# Patient Record
Sex: FEMALE | Race: BLACK OR AFRICAN AMERICAN | NOT HISPANIC OR LATINO | Employment: OTHER | URBAN - METROPOLITAN AREA
[De-identification: names, ages, dates, MRNs, and addresses within clinical notes are randomized per-mention and may not be internally consistent; named-entity substitution may affect disease eponyms.]

---

## 2019-08-27 ENCOUNTER — OFFICE VISIT (OUTPATIENT)
Dept: OBSTETRICS AND GYNECOLOGY | Facility: CLINIC | Age: 59
End: 2019-08-27

## 2019-08-27 VITALS
HEIGHT: 59 IN | WEIGHT: 130 LBS | BODY MASS INDEX: 26.21 KG/M2 | DIASTOLIC BLOOD PRESSURE: 86 MMHG | SYSTOLIC BLOOD PRESSURE: 136 MMHG

## 2019-08-27 DIAGNOSIS — L81.9 PIGMENTED SKIN LESION: ICD-10-CM

## 2019-08-27 DIAGNOSIS — N95.1 MENOPAUSE SYNDROME: ICD-10-CM

## 2019-08-27 DIAGNOSIS — Z00.00 ANNUAL PHYSICAL EXAM: Primary | ICD-10-CM

## 2019-08-27 DIAGNOSIS — N95.2 ATROPHIC VAGINITIS: ICD-10-CM

## 2019-08-27 PROCEDURE — 99213 OFFICE O/P EST LOW 20 MIN: CPT | Performed by: OBSTETRICS & GYNECOLOGY

## 2019-08-27 PROCEDURE — 99386 PREV VISIT NEW AGE 40-64: CPT | Performed by: OBSTETRICS & GYNECOLOGY

## 2019-08-27 RX ORDER — AMLODIPINE BESYLATE 10 MG/1
10 TABLET ORAL DAILY
COMMUNITY
End: 2020-10-06

## 2019-08-27 RX ORDER — ATORVASTATIN CALCIUM 10 MG/1
10 TABLET, FILM COATED ORAL DAILY
COMMUNITY
End: 2021-03-08

## 2019-08-27 RX ORDER — ALBUTEROL SULFATE 90 UG/1
2 AEROSOL, METERED RESPIRATORY (INHALATION)
COMMUNITY
Start: 2019-08-01 | End: 2022-01-13 | Stop reason: SDUPTHER

## 2019-08-27 RX ORDER — POTASSIUM CHLORIDE 750 MG/1
TABLET, FILM COATED, EXTENDED RELEASE ORAL
COMMUNITY
Start: 2019-06-04 | End: 2020-07-16

## 2019-08-27 NOTE — PROGRESS NOTES
HALEY Nieves  is a 58 y.o. female who presents for 2 reasons.  First, she would like to establish care and have a routine gynecologic exam.  She reports a recent normal mammogram.  Also, a recent normal colonoscopy.  Bowels and bladder are functioning normally.  Overall, she is feeling well.  Second, she would like to discuss menopausal symptoms.  She experience hot flashes and night sweats as well as mood swings and vaginal dryness.  This has persisted since she went through the change approximately 10 years ago.  It is neither worsened, nor has it improved.  Mostly, she is troubled by vaginal dryness and difficulty with intercourse.  This has not responded adequately to a personal lubricant.    Chief Complaint   Patient presents with   • New Gyn     Patient is here for a new gyn annual and discuss menapausel symptoms.       Past Medical History:   Diagnosis Date   • Hyperlipidemia    • Hypertension        Past Surgical History:   Procedure Laterality Date   •  SECTION         Social History     Socioeconomic History   • Marital status:      Spouse name: Not on file   • Number of children: Not on file   • Years of education: Not on file   • Highest education level: Not on file   Tobacco Use   • Smoking status: Current Some Day Smoker   • Smokeless tobacco: Never Used   Substance and Sexual Activity   • Alcohol use: Yes     Comment: socially   • Drug use: No   • Sexual activity: Yes     Partners: Male       The following portions of the patient's history were reviewed and updated as appropriate: allergies, current medications, past family history, past medical history, past social history, past surgical history and problem list.    Review of Systems this is positive for hot flashes and night sweats.  It is positive for mood swings.  It is positive for vaginal dryness.  It is negative for urinary or fecal incontinence.  It is negative for unexplained weight change.  It is negative for vaginal  bleeding.  All other systems are reviewed and are negative          Physical Exam   Constitutional: She is oriented to person, place, and time. She appears well-developed and well-nourished.   HENT:   Head: Normocephalic and atraumatic.   Cardiovascular: Normal rate and regular rhythm.   Pulmonary/Chest: Effort normal and breath sounds normal. She has no wheezes. She has no rales.   The breasts are homogeneous.  There are no palpable lumps.  Nipple discharge and axillary adenopathy are absent.   Abdominal: Soft. She exhibits no distension. There is no tenderness.   Genitourinary: Uterus normal. There is no lesion on the right labia. There is no lesion on the left labia. Cervix exhibits no motion tenderness. Right adnexum displays no mass and no tenderness. Left adnexum displays no mass and no tenderness.   Genitourinary Comments: The vagina is atrophic.  There is a minimal cystocele.  No rectocele.  Good apical support.  No palpable pelvic masses   Neurological: She is alert and oriented to person, place, and time.   Skin: Skin is warm and dry.   Nursing note and vitals reviewed.      Assessment    Raquel was seen today for new gyn.    Diagnoses and all orders for this visit:    Annual physical exam    Menopause syndrome    Atrophic vaginitis    Pigmented skin lesion  -     Ambulatory Referral to Dermatology        Plan  1. Annual examination and Pap smear performed  2. Counseled regarding importance of regular mammograms.  The patient had a mammogram earlier this spring.  It was negative.  3. Counseled regarding colon cancer screening.  The patient has a family history of colon cancer.  She is current with colon cancer screening.  4. Menopausal symptoms and atrophic vaginitis.  Counseled regarding the pathophysiology of this condition and options for management.  20 minutes of a 40-minute visit were spent in direct face-to-face counseling on this issue.  For hot flashes, night sweats and mood swings, we discussed  the benefits and risks of expectant management versus aerobic exercise versus treatment with effects or versus hormone replacement therapy.  I answered the patient's questions.  For now, she plans to try aerobic exercise for this.  We also discussed dyspareunia due to atrophic vaginitis.  This is only partially responded to a personal lubricant.  We discussed the benefits and risks of using vaginal estrogen.  The patient is considering her options and will call with a decision.    5. Return in about 1 year (around 8/27/2020).    Social History     Tobacco Use   Smoking Status Current Some Day Smoker   6.     7.

## 2019-08-30 LAB
CONV .: NORMAL
CYTOLOGIST CVX/VAG CYTO: NORMAL
CYTOLOGY CVX/VAG DOC CYTO: NORMAL
CYTOLOGY CVX/VAG DOC THIN PREP: NORMAL
DX ICD CODE: NORMAL
HIV 1 & 2 AB SER-IMP: NORMAL
Lab: NORMAL
OTHER STN SPEC: NORMAL
STAT OF ADQ CVX/VAG CYTO-IMP: NORMAL

## 2020-07-16 ENCOUNTER — OFFICE VISIT (OUTPATIENT)
Dept: FAMILY MEDICINE CLINIC | Facility: CLINIC | Age: 60
End: 2020-07-16

## 2020-07-16 VITALS
WEIGHT: 125.8 LBS | HEART RATE: 88 BPM | DIASTOLIC BLOOD PRESSURE: 90 MMHG | BODY MASS INDEX: 25.36 KG/M2 | RESPIRATION RATE: 16 BRPM | SYSTOLIC BLOOD PRESSURE: 140 MMHG | OXYGEN SATURATION: 99 % | HEIGHT: 59 IN | TEMPERATURE: 97.3 F

## 2020-07-16 DIAGNOSIS — Z23 NEED FOR 23-POLYVALENT PNEUMOCOCCAL POLYSACCHARIDE VACCINE: ICD-10-CM

## 2020-07-16 DIAGNOSIS — I10 ESSENTIAL HYPERTENSION: Primary | ICD-10-CM

## 2020-07-16 DIAGNOSIS — E87.6 HYPOKALEMIA: ICD-10-CM

## 2020-07-16 PROBLEM — Z80.0 FAMILY HISTORY OF COLON CANCER IN MOTHER: Status: ACTIVE | Noted: 2019-02-27

## 2020-07-16 PROBLEM — J20.9 ACUTE BRONCHITIS WITH CHRONIC OBSTRUCTIVE PULMONARY DISEASE (COPD): Status: ACTIVE | Noted: 2019-08-01

## 2020-07-16 PROBLEM — E78.5 HYPERLIPIDEMIA: Status: ACTIVE | Noted: 2020-07-16

## 2020-07-16 PROBLEM — Z12.11 COLON CANCER SCREENING: Status: ACTIVE | Noted: 2019-01-14

## 2020-07-16 PROBLEM — Z72.0 TOBACCO ABUSE: Status: ACTIVE | Noted: 2019-08-01

## 2020-07-16 PROBLEM — J44.0 ACUTE BRONCHITIS WITH CHRONIC OBSTRUCTIVE PULMONARY DISEASE (COPD) (HCC): Status: ACTIVE | Noted: 2019-08-01

## 2020-07-16 PROCEDURE — 99213 OFFICE O/P EST LOW 20 MIN: CPT | Performed by: INTERNAL MEDICINE

## 2020-07-16 PROCEDURE — 90732 PPSV23 VACC 2 YRS+ SUBQ/IM: CPT | Performed by: INTERNAL MEDICINE

## 2020-07-16 PROCEDURE — 90471 IMMUNIZATION ADMIN: CPT | Performed by: INTERNAL MEDICINE

## 2020-07-16 RX ORDER — IBUPROFEN 600 MG/1
600 TABLET ORAL DAILY PRN
COMMUNITY
Start: 2020-01-02

## 2020-07-17 LAB
BUN SERPL-MCNC: 9 MG/DL (ref 8–23)
BUN/CREAT SERPL: 13.6 (ref 7–25)
CALCIUM SERPL-MCNC: 9.7 MG/DL (ref 8.6–10.5)
CHLORIDE SERPL-SCNC: 103 MMOL/L (ref 98–107)
CO2 SERPL-SCNC: 27.4 MMOL/L (ref 22–29)
CREAT SERPL-MCNC: 0.66 MG/DL (ref 0.57–1)
GLUCOSE SERPL-MCNC: 121 MG/DL (ref 65–99)
POTASSIUM SERPL-SCNC: 3.9 MMOL/L (ref 3.5–5.2)
SODIUM SERPL-SCNC: 141 MMOL/L (ref 136–145)

## 2020-07-24 DIAGNOSIS — J20.9 ACUTE BRONCHITIS WITH CHRONIC OBSTRUCTIVE PULMONARY DISEASE (COPD) (HCC): Primary | ICD-10-CM

## 2020-07-24 DIAGNOSIS — J44.0 ACUTE BRONCHITIS WITH CHRONIC OBSTRUCTIVE PULMONARY DISEASE (COPD) (HCC): Primary | ICD-10-CM

## 2020-07-25 RX ORDER — LISINOPRIL 10 MG/1
20 TABLET ORAL DAILY
Qty: 180 TABLET | Refills: 3 | Status: SHIPPED | OUTPATIENT
Start: 2020-07-25 | End: 2020-10-23

## 2020-08-04 ENCOUNTER — TELEPHONE (OUTPATIENT)
Dept: FAMILY MEDICINE CLINIC | Facility: CLINIC | Age: 60
End: 2020-08-04

## 2020-08-04 NOTE — TELEPHONE ENCOUNTER
----- Message from Raymond Saldaña MD sent at 8/3/2020 10:19 PM EDT -----  Please call the patient regarding her abnormal result. Keep follow-up regarding glucose

## 2020-08-04 NOTE — TELEPHONE ENCOUNTER
CALLED AND PROVIDED PT WITH TEST RESULTS. ALSO SCHEDULE PT A FOLLOW UP APTT ON 8/24/20.      *HUB TO SHARE**

## 2020-08-06 ENCOUNTER — TELEPHONE (OUTPATIENT)
Dept: FAMILY MEDICINE CLINIC | Facility: CLINIC | Age: 60
End: 2020-08-06

## 2020-08-06 NOTE — TELEPHONE ENCOUNTER
PATIENT CALLED FOR MED REFILL    FAMOTIDINE 20 MG    SHE WENT TO [facility] AND THEY STATED THAT THEY CAN'T GET THE MEDICATION. IS THERE ANOTHER MEDICATION THAT CAN BE PRESCRIBED IN PLACE OF THIS.        [place] [id number] [facility] [address]  - [phone]  - [phone]   [phone]    PATIENT CALL BACK NUMBER [phone]

## 2020-08-08 DIAGNOSIS — K21.9 GASTROESOPHAGEAL REFLUX DISEASE WITHOUT ESOPHAGITIS: Primary | ICD-10-CM

## 2020-08-08 RX ORDER — PANTOPRAZOLE SODIUM 20 MG/1
20 TABLET, DELAYED RELEASE ORAL DAILY
Qty: 30 TABLET | Refills: 3 | Status: SHIPPED | OUTPATIENT
Start: 2020-08-08 | End: 2020-11-09

## 2020-08-12 ENCOUNTER — TELEPHONE (OUTPATIENT)
Dept: FAMILY MEDICINE CLINIC | Facility: CLINIC | Age: 60
End: 2020-08-12

## 2020-08-12 NOTE — TELEPHONE ENCOUNTER
PATIENT STATED THAT ON CALL DOCTOR FOLLOW UP ON BLOOD PRESSURE.  ON CALL DOCTOR DID NOT RECOMMEND PATIENT GO TO THE ER.  PATIENT REPORTS BLOOD PRESSURE 193/[phone]  PLEASE REVIEW    PATIENT CALL BACK: [phone]

## 2020-08-13 ENCOUNTER — OFFICE VISIT (OUTPATIENT)
Dept: FAMILY MEDICINE CLINIC | Facility: CLINIC | Age: 60
End: 2020-08-13

## 2020-08-13 VITALS
HEIGHT: 59 IN | HEART RATE: 97 BPM | BODY MASS INDEX: 25.28 KG/M2 | WEIGHT: 125.4 LBS | TEMPERATURE: 97.5 F | RESPIRATION RATE: 16 BRPM | OXYGEN SATURATION: 98 % | DIASTOLIC BLOOD PRESSURE: 80 MMHG | SYSTOLIC BLOOD PRESSURE: 140 MMHG

## 2020-08-13 DIAGNOSIS — F41.1 ANXIETY STATE: ICD-10-CM

## 2020-08-13 DIAGNOSIS — R73.09 ELEVATED GLUCOSE: Primary | ICD-10-CM

## 2020-08-13 LAB
EXPIRATION DATE: NORMAL
HBA1C MFR BLD: 6.4 %
Lab: NORMAL

## 2020-08-13 PROCEDURE — 36416 COLLJ CAPILLARY BLOOD SPEC: CPT | Performed by: INTERNAL MEDICINE

## 2020-08-13 PROCEDURE — 83036 HEMOGLOBIN GLYCOSYLATED A1C: CPT | Performed by: INTERNAL MEDICINE

## 2020-08-13 PROCEDURE — 99214 OFFICE O/P EST MOD 30 MIN: CPT | Performed by: INTERNAL MEDICINE

## 2020-08-13 RX ORDER — OMEPRAZOLE 20 MG/1
20 CAPSULE, DELAYED RELEASE ORAL DAILY
Qty: 30 CAPSULE | Refills: 1 | Status: SHIPPED | OUTPATIENT
Start: 2020-08-13 | End: 2021-08-19

## 2020-08-18 ENCOUNTER — TELEPHONE (OUTPATIENT)
Dept: FAMILY MEDICINE CLINIC | Facility: CLINIC | Age: 60
End: 2020-08-18

## 2020-08-18 NOTE — TELEPHONE ENCOUNTER
----- Message from Raymond Saldaña MD sent at 8/17/2020  6:23 AM EDT -----  Call patient to inform that results were normal

## 2020-08-18 NOTE — PROGRESS NOTES
2020    CC: Hypertension (follow up)  .        HPI  Hypertension   This is a chronic problem. The current episode started more than 1 month ago. The problem has been gradually improving since onset. The problem is controlled. Associated symptoms include anxiety. There are no associated agents to hypertension.        Anam Nieves is a 59 y.o. female.      The following portions of the patient's history were reviewed and updated as appropriate: allergies, current medications, past family history, past medical history, past social history, past surgical history and problem list.    Problem List  Patient Active Problem List   Diagnosis   • Colon cancer screening   • Acute bronchitis with chronic obstructive pulmonary disease (COPD) (CMS/[facility])   • Family history of colon cancer in mother   • Hyperlipidemia   • Hypertension   • Hypokalemia   • Iliotibial band syndrome of right side   • Low back pain radiating to right leg   • Tobacco abuse   • Trochanteric bursitis of right hip       Past Medical History  Past Medical History:   Diagnosis Date   • Hyperlipidemia    • Hypertension        Surgical History  Past Surgical History:   Procedure Laterality Date   •  SECTION         Family History  Family History   Problem Relation Age of Onset   • Heart attack Father    • Hypertension Mother    • Heart disease Sister    • Diabetes Sister        Social History  Social History    Tobacco Use      Smoking status: Current Some Day Smoker        Packs/day: 0.25        Years: 20.00        Pack years: 5      Smokeless tobacco: Never Used       Is the Patient a current tobacco user? No    Allergies  No Known Allergies    Current Medications    Current Outpatient Medications:   •  albuterol sulfate  (90 Base) MCG/ACT inhaler, Inhale 2 puffs., Disp: , Rfl:   •  amLODIPine (NORVASC) 10 MG tablet, Take 10 mg by mouth Daily., Disp: , Rfl:   •  atorvastatin (LIPITOR) 10 MG tablet, Take 10 mg by mouth  Daily., Disp: , Rfl:   •  Calcium Carbonate 1500 (600 Ca) MG tablet, Take 1 tablet by mouth Daily., Disp: , Rfl:   •  ibuprofen (ADVIL,MOTRIN) 600 MG tablet, Take 600 mg by mouth Daily As Needed., Disp: , Rfl:   •  MULTIPLE VITAMINS-MINERALS ER PO, Take 1 tablet by mouth Daily., Disp: , Rfl:   •  vitamin E 100 UNIT capsule, Take 100 Units by mouth Daily., Disp: , Rfl:   •  lisinopril (PRINIVIL,ZESTRIL) 10 MG tablet, Take 2 tablets by mouth Daily for 90 days. TAKE 1 TAB DAILY, Disp: 180 tablet, Rfl: 3  •  omeprazole (PrilOSEC) 20 MG capsule, Take 1 capsule by mouth Daily., Disp: 30 capsule, Rfl: 1  •  pantoprazole (Protonix) 20 MG EC tablet, Take 1 tablet by mouth Daily., Disp: 30 tablet, Rfl: 3     Review of System  Review of Systems   Constitutional: Negative.    HENT: Negative.    Eyes: Negative.    Respiratory: Negative.    Cardiovascular: Negative.    Gastrointestinal: Negative.    Musculoskeletal: Negative.    Skin: Negative.    Psychiatric/Behavioral: Negative.      I have reviewed and confirmed the accuracy of the ROS as documented by the MA/LPN/RN Raymond Saldaña MD    Vitals:    07/16/20 0906   BP: 140/90   Pulse: 88   Resp: 16   Temp: 97.3 °F (36.3 °C)   SpO2: 99%     Body mass index is 25.41 kg/m².    Objective     Office Visit on 07/16/2020   Component Date Value Ref Range Status   • Glucose 07/16/2020 121* 65 - 99 mg/dL Final   • BUN 07/16/2020 9  8 - 23 mg/dL Final   • Creatinine 07/16/2020 0.66  0.57 - 1.00 mg/dL Final   • eGFR Non African Am 07/16/2020 91  >60 mL/min/1.73 Final   • eGFR African Am 07/16/2020 110  >60 mL/min/1.73 Final   • BUN/Creatinine Ratio 07/16/2020 13.6  7.0 - 25.0 Final   • Sodium 07/16/2020 141  136 - 145 mmol/L Final   • Potassium 07/16/2020 3.9  3.5 - 5.2 mmol/L Final   • Chloride 07/16/2020 103  98 - 107 mmol/L Final   • Total CO2 07/16/2020 27.4  22.0 - 29.0 mmol/L Final   • Calcium 07/16/2020 9.7  8.6 - 10.5 mg/dL Final       Physical Exam  Physical Exam    Constitutional: She appears well-developed and well-nourished.   Eyes: Conjunctivae and EOM are normal.   Neck: Normal range of motion. Neck supple.   Cardiovascular: Normal rate, regular rhythm and normal heart sounds.   Pulmonary/Chest: Effort normal and breath sounds normal.   Musculoskeletal: Normal range of motion.   Skin: Skin is warm and dry.   Psychiatric: She has a normal mood and affect. Her behavior is normal.   Nursing note and vitals reviewed.      Assessment/Plan   Raquel was seen today for hypertension.    This patient presented for follow-up of hypertension and hypokalemia.  With her last visit on 5/21/20 patient had low potassium level and potassium tablets were added to correct this.  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  His presented at least twice in the emergency room over the past several months due to hypertension but her blood pressure was able to be controlled after short period of time.  With her last visit we asked her to get an [employer] and paternal blood pressure machine to accurately check her blood pressure home as she had previously used a wrist blood pressure monitor.  As part of the visit today the patient was to bring a blood pressure monitor with her we could evaluate its accuracy.  Unfortunately, the patient did not bring her machine today.    Check her blood pressure by me today was 134/80 in the left arm sitting position standard cuff.  A [employer] she's taken the medication as prescribed.    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              Diagnoses and all orders for this visit:    Essential hypertension: Controlled  -     Cancel: Basic metabolic panel  -     pneumococcal polysaccharide 23-valent (PNEUMOVAX-23) vaccine 0.5 mL  -     Basic metabolic panel    Hypokalemia: Further evaluation  pending    Need for 23-polyvalent pneumococcal polysaccharide vaccine      Plan #1.) physical exam in 2 months  #2.)  The patient will consider getting a shingles vaccine  #3.)  Consider getting the pneumonia 23 vaccine  #4.)  Chem-7       Raymond Saldaña MD  07/16/2020

## 2020-08-23 NOTE — PROGRESS NOTES
2020    CC: Hypertension (follow up)  .        HPI  Hypertension   This is a chronic problem. The current episode started more than 1 month ago. The problem has been gradually improving since onset. The problem is uncontrolled. Associated symptoms include anxiety. Pertinent negatives include no chest pain or shortness of breath. There are no associated agents to hypertension. Risk factors for coronary artery disease include stress. Past treatments include ACE inhibitors and calcium channel blockers. The current treatment provides mild improvement.        Anam Nieves is a 59 y.o. female.      The following portions of the patient's history were reviewed and updated as appropriate: allergies, current medications, past family history, past medical history, past social history, past surgical history and problem list.    Problem List  Patient Active Problem List   Diagnosis   • Colon cancer screening   • Acute bronchitis with chronic obstructive pulmonary disease (COPD) (CMS/[facility])   • Family history of colon cancer in mother   • Hyperlipidemia   • Hypertension   • Hypokalemia   • Iliotibial band syndrome of right side   • Low back pain radiating to right leg   • Tobacco abuse   • Trochanteric bursitis of right hip       Past Medical History  Past Medical History:   Diagnosis Date   • Hyperlipidemia    • Hypertension        Surgical History  Past Surgical History:   Procedure Laterality Date   •  SECTION         Family History  Family History   Problem Relation Age of Onset   • Heart attack Father    • Hypertension Mother    • Heart disease Sister    • Diabetes Sister        Social History  Social History    Tobacco Use      Smoking status: Current Some Day Smoker        Packs/day: 0.25        Years: 20.00        Pack years: 5      Smokeless tobacco: Never Used       Is the Patient a current tobacco user? No    Allergies  No Known Allergies    Current Medications    Current Outpatient  Medications:   •  albuterol sulfate  (90 Base) MCG/ACT inhaler, Inhale 2 puffs., Disp: , Rfl:   •  amLODIPine (NORVASC) 10 MG tablet, Take 10 mg by mouth Daily., Disp: , Rfl:   •  atorvastatin (LIPITOR) 10 MG tablet, Take 10 mg by mouth Daily., Disp: , Rfl:   •  Calcium Carbonate 1500 (600 Ca) MG tablet, Take 1 tablet by mouth Daily., Disp: , Rfl:   •  ibuprofen (ADVIL,MOTRIN) 600 MG tablet, Take 600 mg by mouth Daily As Needed., Disp: , Rfl:   •  lisinopril (PRINIVIL,ZESTRIL) 10 MG tablet, Take 2 tablets by mouth Daily for 90 days. TAKE 1 TAB DAILY, Disp: 180 tablet, Rfl: 3  •  MULTIPLE VITAMINS-MINERALS ER PO, Take 1 tablet by mouth Daily., Disp: , Rfl:   •  pantoprazole (Protonix) 20 MG EC tablet, Take 1 tablet by mouth Daily., Disp: 30 tablet, Rfl: 3  •  vitamin E 100 UNIT capsule, Take 100 Units by mouth Daily., Disp: , Rfl:   •  omeprazole (PrilOSEC) 20 MG capsule, Take 1 capsule by mouth Daily., Disp: 30 capsule, Rfl: 1     Review of System  Review of Systems   Constitutional: Negative.  Negative for chills and fever.   HENT: Negative for congestion.    Eyes: Negative.    Respiratory: Negative.  Negative for cough and shortness of breath.    Cardiovascular: Negative.  Negative for chest pain.   Gastrointestinal: Negative.  Negative for abdominal pain.   Musculoskeletal: Negative.  Negative for myalgias.   Skin: Negative.    Neurological: Negative for dizziness and confusion.   Psychiatric/Behavioral: Negative.  Negative for behavioral problems.     I have reviewed and confirmed the accuracy of the ROS as documented by the MA/LPN/RN Raymond Saldaña MD    Vitals:    08/13/20 1419   BP: 140/80   Pulse: 97   Resp: 16   Temp: 97.5 °F (36.4 °C)   SpO2: 98%     Body mass index is 25.33 kg/m².    Objective     Office Visit on 08/13/2020   Component Date Value Ref Range Status   • Hemoglobin A1C 08/13/2020 6.4  % Final   • Lot Number 08/13/2020 [id number]   Final   • Expiration Date 08/13/2020 01/13/2022    Final   Office Visit on 07/16/2020   Component Date Value Ref Range Status   • Glucose 07/16/2020 121* 65 - 99 mg/dL Final   • BUN 07/16/2020 9  8 - 23 mg/dL Final   • Creatinine 07/16/2020 0.66  0.57 - 1.00 mg/dL Final   • eGFR Non African Am 07/16/2020 91  >60 mL/min/1.73 Final   • eGFR African Am 07/16/2020 110  >60 mL/min/1.73 Final   • BUN/Creatinine Ratio 07/16/2020 13.6  7.0 - 25.0 Final   • Sodium 07/16/2020 141  136 - 145 mmol/L Final   • Potassium 07/16/2020 3.9  3.5 - 5.2 mmol/L Final   • Chloride 07/16/2020 103  98 - 107 mmol/L Final   • Total CO2 07/16/2020 27.4  22.0 - 29.0 mmol/L Final   • Calcium 07/16/2020 9.7  8.6 - 10.5 mg/dL Final       Physical Exam  Physical Exam   Constitutional: She appears well-developed and well-nourished.   Eyes: Conjunctivae and EOM are normal.   Neck: Normal range of motion.   Cardiovascular: Normal rate, regular rhythm and normal heart sounds.   Pulmonary/Chest: Effort normal and breath sounds normal.   Skin: Skin is warm and dry.   Psychiatric: She has a normal mood and affect. Her behavior is normal.   Nursing note and vitals reviewed.      Assessment/Plan   Raquel was seen today for hypertension.    This pleasant patient has been to the emergency room to 3 times over the past several months with the hypertension this is usually late to anxiety.  She is currently working on her dissertation for under much stress etc.  Last visit we asked her to get ambulatory blood pressure machine and bring that in.  She has done so.    Review of her level shows that her blood pressure ranges from 146-152/90-94 here in the office annually I get 130/80 her Omron 500 series reads 140/90.  I therefore feel that her blood pressure machine is reading somewhat high think that her blood pressure elevations are accentuated by stress is made that she admits to sleeping poorly for the past several nights a graft her glucose machine also reads excellent today at 121 hematoma A1c done today is  6.4!.  The patient relates that when she takes the atorvastatin she feels tingly and nervous so we have asked her to DC that for now.        Diagnoses and all orders for this visit:    Elevated glucose  -     POCT glycated hemoglobin, total; Standing  -     POCT glycated hemoglobin, total    Anxiety state    Other orders  -     omeprazole (PrilOSEC) 20 MG capsule; Take 1 capsule by mouth Daily.    Plan  #1.)  Treat her symptomatically for anxiety with lorazepam 0.5 mg a half tab by mouth daily at bedtime for the next few nights.    Plan #2.)  DC atorvastatin for now  3.)  Follow-up in the next few weeks for reevaluation of anxiety.      Note that she has completed her  but is waiting for her program chair.         Raymond Saldaña MD  08/13/2020

## 2020-09-01 ENCOUNTER — OFFICE VISIT (OUTPATIENT)
Dept: OBSTETRICS AND GYNECOLOGY | Facility: CLINIC | Age: 60
End: 2020-09-01

## 2020-09-01 VITALS
HEIGHT: 59 IN | WEIGHT: 126 LBS | SYSTOLIC BLOOD PRESSURE: 118 MMHG | BODY MASS INDEX: 25.4 KG/M2 | DIASTOLIC BLOOD PRESSURE: 67 MMHG

## 2020-09-01 DIAGNOSIS — Z78.0 MENOPAUSE: ICD-10-CM

## 2020-09-01 DIAGNOSIS — Z00.00 ANNUAL PHYSICAL EXAM: Primary | ICD-10-CM

## 2020-09-01 PROCEDURE — 99213 OFFICE O/P EST LOW 20 MIN: CPT | Performed by: OBSTETRICS & GYNECOLOGY

## 2020-09-01 PROCEDURE — 99396 PREV VISIT EST AGE 40-64: CPT | Performed by: OBSTETRICS & GYNECOLOGY

## 2020-09-01 NOTE — PROGRESS NOTES
HALEY Nieves  is a 59 y.o. female who presents for 2 reasons.  First, she is due for her routine gynecologic exam.  Overall, she is feeling well.  Bowels and bladder are functioning normally.  The patient reports a recent normal mammogram.  This is done through the [facility] system and I do not have results.  The patient reports that her results go to her primary care physician.  The patient is current with colon cancer screening.  Next, the patient has several symptoms of the menopause.  She still experiences hot flashes and night sweats.  Also mood swings and vaginal dryness.  She has started on an antianxiety medicine with her primary care physician, but this has not brought about relief of the above-named symptoms.    Chief Complaint   Patient presents with   • Gynecologic Exam     Patient is here for a annual and vaginal dryness.       Past Medical History:   Diagnosis Date   • Hyperlipidemia    • Hypertension        Past Surgical History:   Procedure Laterality Date   •  SECTION         Social History     Socioeconomic History   • Marital status:      Spouse name: Not on file   • Number of children: Not on file   • Years of education: Not on file   • Highest education level: Not on file   Tobacco Use   • Smoking status: Current Some Day Smoker     Packs/day: 0.25     Years: 20.00     Pack years: 5.00   • Smokeless tobacco: Never Used   Substance and Sexual Activity   • Alcohol use: Yes     Comment: socially   • Drug use: No   • Sexual activity: Yes     Partners: Male       The following portions of the patient's history were reviewed and updated as appropriate: allergies, current medications, past family history, past medical history, past social history, past surgical history and problem list.    Review of Systems      This is positive for hot flashes and night sweats.  It is positive for mood swings.  It is positive for vaginal dryness.  It is negative for fever or chills.  Negative  for itching.  Negative for unexplained weight change.  All other systems are reviewed and are negative    Physical Exam   Constitutional: She is oriented to person, place, and time. She appears well-developed and well-nourished.   HENT:   Head: Normocephalic and atraumatic.   Cardiovascular: Normal rate and regular rhythm.   Pulmonary/Chest: Effort normal and breath sounds normal. She has no wheezes. She has no rales.   The breasts are homogeneous.  There are no palpable lumps.  Nipple discharge and axillary adenopathy are absent.   Abdominal: Soft. She exhibits no distension. There is no tenderness.   Genitourinary: Uterus normal. There is no lesion on the right labia. There is no lesion on the left labia. Cervix exhibits no motion tenderness. Right adnexum displays no mass and no tenderness. Left adnexum displays no mass and no tenderness.   Genitourinary Comments: The vagina is atrophic, without lesion.  Support is adequate   Neurological: She is alert and oriented to person, place, and time.   Skin: Skin is warm and dry.   Nursing note and vitals reviewed.      Assessment    Raquel was seen today for gynecologic exam.    Diagnoses and all orders for this visit:    Annual physical exam    Menopause        Plan  1. Annual examination performed  2. The patient reports that she is current with mammograms and that they have been negative.  3. The patient is current with colon cancer screening.  4. Menopausal symptoms including hot flashes, night sweats, mood swings and vaginal dryness.  We discussed the pathophysiology of this condition and options for treatment.  20 minutes of a 30-minute visit were spent in direct face-to-face counseling on this issue.  We discussed the benefits and risks of using hormone replacement.  Data from the [facility] were reviewed and the patient's questions were answered.  The patient is already doing aerobic exercise.  We also discussed the possibility of using vaginal estrogen to address the  vaginal dryness, although this would not help with hot flashes and night sweats.  The patient is not a candidate for Effexor, as she is already using an antianxiety through her primary care physician.  I answered the patient's questions.  Ultimately, she has decided to manage expectantly.  She will call if she would like to discuss this further.    5. Return in about 1 year (around 9/1/2021).    Social History     Tobacco Use   Smoking Status Current Some Day Smoker   • Packs/day: 0.25   • Years: 20.00   • Pack years: 5.00   6.

## 2020-09-03 LAB
CONV .: NORMAL
CYTOLOGIST CVX/VAG CYTO: NORMAL
CYTOLOGY CVX/VAG DOC CYTO: NORMAL
CYTOLOGY CVX/VAG DOC THIN PREP: NORMAL
DX ICD CODE: NORMAL
HIV 1 & 2 AB SER-IMP: NORMAL
OTHER STN SPEC: NORMAL
STAT OF ADQ CVX/VAG CYTO-IMP: NORMAL

## 2020-09-15 ENCOUNTER — TELEPHONE (OUTPATIENT)
Dept: FAMILY MEDICINE CLINIC | Facility: CLINIC | Age: 60
End: 2020-09-15

## 2020-09-15 NOTE — TELEPHONE ENCOUNTER
PATIENT RECEIVED LETTER FROM INSURANCE STATING DR NIX WOULD NO LONGER BE HER PROVIDER    SHE WOULD LIKE A CALL BACK TO DISCUSS    PLEASE CALL HER: [phone]

## 2020-10-06 RX ORDER — AMLODIPINE BESYLATE 10 MG/1
TABLET ORAL
Qty: 30 TABLET | Refills: 2 | Status: SHIPPED | OUTPATIENT
Start: 2020-10-06 | End: 2021-01-07

## 2020-12-12 RX ORDER — LISINOPRIL 20 MG/1
20 TABLET ORAL DAILY
Qty: 30 TABLET | Refills: 3 | Status: SHIPPED | OUTPATIENT
Start: 2020-12-12 | End: 2021-06-14

## 2020-12-15 ENCOUNTER — TELEPHONE (OUTPATIENT)
Dept: FAMILY MEDICINE CLINIC | Facility: CLINIC | Age: 60
End: 2020-12-15

## 2020-12-15 NOTE — TELEPHONE ENCOUNTER
Patient wanted to advise that her script for Pantoprazole was actually called in and her  went and picked it up yesterday. Patient wanted to let the MA know so she doesn't call [facility].    Patient can be reached at [phone].

## 2021-01-07 RX ORDER — AMLODIPINE BESYLATE 10 MG/1
TABLET ORAL
Qty: 30 TABLET | Refills: 1 | Status: SHIPPED | OUTPATIENT
Start: 2021-01-07 | End: 2021-03-10 | Stop reason: SDUPTHER

## 2021-03-08 ENCOUNTER — OFFICE VISIT (OUTPATIENT)
Dept: FAMILY MEDICINE CLINIC | Facility: CLINIC | Age: 61
End: 2021-03-08

## 2021-03-08 VITALS
RESPIRATION RATE: 16 BRPM | HEIGHT: 59 IN | HEART RATE: 101 BPM | SYSTOLIC BLOOD PRESSURE: 140 MMHG | BODY MASS INDEX: 25.4 KG/M2 | WEIGHT: 126 LBS | OXYGEN SATURATION: 99 % | DIASTOLIC BLOOD PRESSURE: 80 MMHG | TEMPERATURE: 97.8 F

## 2021-03-08 DIAGNOSIS — F41.9 ANXIETY: Chronic | ICD-10-CM

## 2021-03-08 DIAGNOSIS — I10 ESSENTIAL HYPERTENSION: Primary | Chronic | ICD-10-CM

## 2021-03-08 PROCEDURE — 99214 OFFICE O/P EST MOD 30 MIN: CPT | Performed by: INTERNAL MEDICINE

## 2021-03-08 RX ORDER — LISINOPRIL 40 MG/1
40 TABLET ORAL DAILY
Qty: 30 TABLET | Refills: 3 | Status: SHIPPED | OUTPATIENT
Start: 2021-03-08 | End: 2021-07-06

## 2021-03-08 NOTE — PROGRESS NOTES
2021    CC: Hypertension (follow up.  Has an oder when urinating.  no pain, itching, or burning)  .        HPI  Hypertension  This is a chronic problem. The current episode started today. The problem has been gradually improving since onset. The problem is controlled. There are no associated agents to hypertension. There are no known risk factors for coronary artery disease. Past treatments include nothing. Current antihypertension treatment includes ACE inhibitors and calcium channel blockers. There are no compliance problems.         Anam Nieves is a 60 y.o. female.      The following portions of the patient's history were reviewed and updated as appropriate: allergies, current medications, past family history, past medical history, past social history, past surgical history and problem list.    Problem List  Patient Active Problem List   Diagnosis   • Colon cancer screening   • Acute bronchitis with chronic obstructive pulmonary disease (COPD) (CMS/[facility])   • Family history of colon cancer in mother   • Hyperlipidemia   • Hypertension   • Hypokalemia   • Iliotibial band syndrome of right side   • Low back pain radiating to right leg   • Tobacco abuse   • Trochanteric bursitis of right hip       Past Medical History  Past Medical History:   Diagnosis Date   • Hyperlipidemia    • Hypertension        Surgical History  Past Surgical History:   Procedure Laterality Date   •  SECTION         Family History  Family History   Problem Relation Age of Onset   • Heart attack Father    • Hypertension Mother    • Heart disease Sister    • Diabetes Sister        Social History  Social History    Tobacco Use      Smoking status: Current Some Day Smoker        Packs/day: 0.25        Years: 20.00        Pack years: 5      Smokeless tobacco: Never Used       Is the Patient a current tobacco user? No    Allergies  No Known Allergies    Current Medications    Current Outpatient Medications:   •   albuterol sulfate  (90 Base) MCG/ACT inhaler, Inhale 2 puffs., Disp: , Rfl:   •  amLODIPine (NORVASC) 10 MG tablet, TAKE ONE TABLET BY MOUTH EVERY MORNING, Disp: 30 tablet, Rfl: 1  •  ibuprofen (ADVIL,MOTRIN) 600 MG tablet, Take 600 mg by mouth Daily As Needed., Disp: , Rfl:   •  lisinopril (PRINIVIL,ZESTRIL) 20 MG tablet, Take 1 tablet by mouth Daily., Disp: 30 tablet, Rfl: 3  •  MULTIPLE VITAMINS-MINERALS ER PO, Take 1 tablet by mouth Daily., Disp: , Rfl:   •  omeprazole (PrilOSEC) 20 MG capsule, Take 1 capsule by mouth Daily., Disp: 30 capsule, Rfl: 1  •  vitamin E 100 UNIT capsule, Take 100 Units by mouth Daily., Disp: , Rfl:   •  lisinopril (PRINIVIL,ZESTRIL) 40 MG tablet, Take 1 tablet by mouth Daily., Disp: 30 tablet, Rfl: 3     Review of System  Review of Systems   Respiratory: Negative.    Cardiovascular: Negative.    Endocrine: Negative.    Genitourinary: Negative.      I have reviewed and confirmed the accuracy of the ROS as documented by the MA/LPN/RN Raymond Saldaña MD    Vitals:    03/08/21 1402   BP: 140/80   Pulse: 101   Resp: 16   Temp: 97.8 °F (36.6 °C)   SpO2: 99%     Body mass index is 25.45 kg/m².    Objective     Physical Exam  Physical Exam  Cardiovascular:      Rate and Rhythm: Normal rate and regular rhythm.      Pulses: Normal pulses.      Heart sounds: Normal heart sounds.   Pulmonary:      Effort: Pulmonary effort is normal.      Breath sounds: Normal breath sounds.         Assessment/Plan      This pleasant patient presents for follow-up of hypertension.  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  She has submitted 3 chapters but is still having some difficulty with the last 2 and her preceptor.  She relates that she is sleeping poorly and has pounding headache at times.  She does not want an anxiolytic.    Blood pressure reevaluation by me in the left arm sitting position standard cuff was 146/84.  Patient  currently is on amlodipine 10 mg and lisinopril 20 mg.        Diagnoses and all orders for this visit:    1. Essential hypertension (Primary)    2. Anxiety    Other orders  -     lisinopril (PRINIVIL,ZESTRIL) 40 MG tablet; Take 1 tablet by mouth Daily.  Dispense: 30 tablet; Refill: 3      Plan:  1.)  Increase lisinopril to 40 mg p.o. every morning  2.)  Continue amlodipine 10 mg 1 tab p.o. every morning  3.)  Follow-up in the next few weeks for reevaluation.       Raymond Saldaña MD  03/08/2021

## 2021-03-10 DIAGNOSIS — I10 ESSENTIAL HYPERTENSION: Primary | ICD-10-CM

## 2021-03-10 RX ORDER — AMLODIPINE BESYLATE 10 MG/1
10 TABLET ORAL EVERY MORNING
Qty: 30 TABLET | Refills: 1 | Status: SHIPPED | OUTPATIENT
Start: 2021-03-10 | End: 2021-05-10

## 2021-03-29 ENCOUNTER — OFFICE VISIT (OUTPATIENT)
Dept: FAMILY MEDICINE CLINIC | Facility: CLINIC | Age: 61
End: 2021-03-29

## 2021-03-29 VITALS
HEIGHT: 59 IN | WEIGHT: 122 LBS | RESPIRATION RATE: 16 BRPM | TEMPERATURE: 98 F | OXYGEN SATURATION: 98 % | DIASTOLIC BLOOD PRESSURE: 76 MMHG | SYSTOLIC BLOOD PRESSURE: 130 MMHG | HEART RATE: 89 BPM | BODY MASS INDEX: 24.6 KG/M2

## 2021-03-29 DIAGNOSIS — I10 ESSENTIAL HYPERTENSION: Primary | Chronic | ICD-10-CM

## 2021-03-29 DIAGNOSIS — F41.9 ANXIETY: Chronic | ICD-10-CM

## 2021-03-29 PROCEDURE — 99213 OFFICE O/P EST LOW 20 MIN: CPT | Performed by: INTERNAL MEDICINE

## 2021-04-22 NOTE — PROGRESS NOTES
2021    CC: Hypertension (follow up...no other issues)  .        HPI  Hypertension  This is a chronic problem. The current episode started more than 1 year ago. The problem has been gradually improving since onset. The problem is controlled. There are no associated agents to hypertension. Risk factors for coronary artery disease include dyslipidemia and stress. Past treatments include nothing. Current antihypertension treatment includes ACE inhibitors.        Anam Nieves is a 60 y.o. female.      The following portions of the patient's history were reviewed and updated as appropriate: allergies, current medications, past family history, past medical history, past social history, past surgical history and problem list.    Problem List  Patient Active Problem List   Diagnosis   • Colon cancer screening   • Acute bronchitis with chronic obstructive pulmonary disease (COPD) (CMS/[facility])   • Family history of colon cancer in mother   • Hyperlipidemia   • Hypertension   • Hypokalemia   • Iliotibial band syndrome of right side   • Low back pain radiating to right leg   • Tobacco abuse   • Trochanteric bursitis of right hip       Past Medical History  Past Medical History:   Diagnosis Date   • Hyperlipidemia    • Hypertension        Surgical History  Past Surgical History:   Procedure Laterality Date   •  SECTION         Family History  Family History   Problem Relation Age of Onset   • Heart attack Father    • Hypertension Mother    • Heart disease Sister    • Diabetes Sister        Social History  Social History    Tobacco Use      Smoking status: Current Some Day Smoker        Packs/day: 0.25        Years: 20.00        Pack years: 5      Smokeless tobacco: Never Used       Is the Patient a current tobacco user? No    Allergies  No Known Allergies    Current Medications    Current Outpatient Medications:   •  albuterol sulfate  (90 Base) MCG/ACT inhaler, Inhale 2 puffs., Disp: , Rfl:    •  amLODIPine (NORVASC) 10 MG tablet, Take 1 tablet by mouth Every Morning., Disp: 30 tablet, Rfl: 1  •  ibuprofen (ADVIL,MOTRIN) 600 MG tablet, Take 600 mg by mouth Daily As Needed., Disp: , Rfl:   •  lisinopril (PRINIVIL,ZESTRIL) 20 MG tablet, Take 1 tablet by mouth Daily., Disp: 30 tablet, Rfl: 3  •  lisinopril (PRINIVIL,ZESTRIL) 40 MG tablet, Take 1 tablet by mouth Daily., Disp: 30 tablet, Rfl: 3  •  MULTIPLE VITAMINS-MINERALS ER PO, Take 1 tablet by mouth Daily., Disp: , Rfl:   •  omeprazole (PrilOSEC) 20 MG capsule, Take 1 capsule by mouth Daily., Disp: 30 capsule, Rfl: 1  •  vitamin E 100 UNIT capsule, Take 100 Units by mouth Daily., Disp: , Rfl:      Review of System  Review of Systems   Respiratory: Negative.    Cardiovascular: Negative.    Gastrointestinal: Negative.    Endocrine: Negative.      I have reviewed and confirmed the accuracy of the ROS as documented by the MA/LPN/RN Raymond Saldaña MD    Vitals:    03/29/21 1107   BP: 130/76   Pulse: 89   Resp: 16   Temp: 98 °F (36.7 °C)   SpO2: 98%     Body mass index is 24.64 kg/m².    Objective     Physical Exam  Physical Exam  Vitals reviewed.   Constitutional:       Appearance: Normal appearance.   Cardiovascular:      Rate and Rhythm: Normal rate and regular rhythm.      Pulses: Normal pulses.      Heart sounds: Normal heart sounds.   Musculoskeletal:         General: Normal range of motion.   Neurological:      Mental Status: She is alert.         Assessment/Plan      This patient presents for follow-up of hypertension.  She was last seen on 3/8 with elevated blood pressure.  Her pressure at that time was 146/84.  We increased her lisinopril to 40 mg by mouth daily.  Patient relates that she feels better but she still under much stress.  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  She  Understands the importance of keeping her sodium intake low.  As stated allowing time if  possible for outlets such as walking etc. To reduce her stress level.    Note is made she's lost 4 pounds from her last visit but feels overall better.  We'll continue her lisinopril at 40 mg and reevaluate in the next several months.  We have avoided use of anxiolytics because of changes to the thinking acuity it would most likely cause.    We'll continue her on the 2 antihypertensives of amlodipine and lisinopril.  We'll see her back in 3 months unless she needs to see us sooner.        Diagnoses and all orders for this visit:    1. Essential hypertension (Primary)    2. Anxiety             Raymond Saldaña MD  03/29/2021

## 2021-05-10 DIAGNOSIS — I10 ESSENTIAL HYPERTENSION: ICD-10-CM

## 2021-05-10 RX ORDER — AMLODIPINE BESYLATE 10 MG/1
TABLET ORAL
Qty: 30 TABLET | Refills: 3 | Status: SHIPPED | OUTPATIENT
Start: 2021-05-10 | End: 2021-08-19

## 2021-06-14 ENCOUNTER — OFFICE VISIT (OUTPATIENT)
Dept: FAMILY MEDICINE CLINIC | Facility: CLINIC | Age: 61
End: 2021-06-14

## 2021-06-14 VITALS
BODY MASS INDEX: 25 KG/M2 | HEIGHT: 59 IN | SYSTOLIC BLOOD PRESSURE: 130 MMHG | WEIGHT: 124 LBS | DIASTOLIC BLOOD PRESSURE: 78 MMHG | RESPIRATION RATE: 16 BRPM

## 2021-06-14 DIAGNOSIS — G44.309 HEADACHES DUE TO OLD HEAD INJURY: ICD-10-CM

## 2021-06-14 DIAGNOSIS — S09.90XS HEADACHES DUE TO OLD HEAD INJURY: ICD-10-CM

## 2021-06-14 DIAGNOSIS — I10 ESSENTIAL HYPERTENSION: Primary | ICD-10-CM

## 2021-06-14 PROCEDURE — 99214 OFFICE O/P EST MOD 30 MIN: CPT | Performed by: INTERNAL MEDICINE

## 2021-06-14 RX ORDER — HYDROCHLOROTHIAZIDE 12.5 MG/1
12.5 TABLET ORAL DAILY
Qty: 30 TABLET | Refills: 1 | Status: SHIPPED | OUTPATIENT
Start: 2021-06-14 | End: 2021-08-13 | Stop reason: SDUPTHER

## 2021-06-14 NOTE — PROGRESS NOTES
2021    CC: Hypertension (follow up...no other issues)  .        HPI  Hypertension  This is a chronic problem. The current episode started more than 1 year ago. The problem has been gradually improving since onset. The problem is controlled. Current antihypertension treatment includes ACE inhibitors and calcium channel blockers.        Anam Nieves is a 60 y.o. female.      The following portions of the patient's history were reviewed and updated as appropriate: allergies, current medications, past family history, past medical history, past social history, past surgical history and problem list.    Problem List  Patient Active Problem List   Diagnosis   • Colon cancer screening   • Acute bronchitis with chronic obstructive pulmonary disease (COPD) (CMS/[facility])   • Family history of colon cancer in mother   • Hyperlipidemia   • Hypertension   • Hypokalemia   • Iliotibial band syndrome of right side   • Low back pain radiating to right leg   • Tobacco abuse   • Trochanteric bursitis of right hip       Past Medical History  Past Medical History:   Diagnosis Date   • Hyperlipidemia    • Hypertension        Surgical History  Past Surgical History:   Procedure Laterality Date   •  SECTION         Family History  Family History   Problem Relation Age of Onset   • Heart attack Father    • Hypertension Mother    • Heart disease Sister    • Diabetes Sister        Social History  Social History    Tobacco Use      Smoking status: Current Some Day Smoker        Packs/day: 0.25        Years: 20.00        Pack years: 5      Smokeless tobacco: Never Used       Is the Patient a current tobacco user? No    Allergies  No Known Allergies    Current Medications    Current Outpatient Medications:   •  albuterol sulfate  (90 Base) MCG/ACT inhaler, Inhale 2 puffs., Disp: , Rfl:   •  amLODIPine (NORVASC) 10 MG tablet, TAKE ONE TABLET BY MOUTH EVERY MORNING, Disp: 30 tablet, Rfl: 3  •  ibuprofen  (ADVIL,MOTRIN) 600 MG tablet, Take 600 mg by mouth Daily As Needed., Disp: , Rfl:   •  lisinopril (PRINIVIL,ZESTRIL) 40 MG tablet, Take 1 tablet by mouth Daily., Disp: 30 tablet, Rfl: 3  •  MULTIPLE VITAMINS-MINERALS ER PO, Take 1 tablet by mouth Daily., Disp: , Rfl:   •  omeprazole (PrilOSEC) 20 MG capsule, Take 1 capsule by mouth Daily., Disp: 30 capsule, Rfl: 1  •  vitamin E 100 UNIT capsule, Take 100 Units by mouth Daily., Disp: , Rfl:      Review of System  Review of Systems   Constitutional: Negative.    HENT: Negative.    Respiratory: Negative.    Cardiovascular: Negative.    Endocrine: Negative.    Genitourinary: Negative.      I have reviewed and confirmed the accuracy of the ROS as documented by the MA/LPN/RN Raymond Saldaña MD    Vitals:    06/14/21 1459   BP: 130/78   Resp: 16     Body mass index is 25.04 kg/m².    Objective     Physical Exam  Physical Exam  Constitutional:       Appearance: Normal appearance.   Cardiovascular:      Rate and Rhythm: Normal rate.      Pulses: Normal pulses.   Musculoskeletal:      Cervical back: Normal range of motion.   Neurological:      Mental Status: She is alert.         Assessment/Plan        This pleasant patient presents at this time for follow-up of hypertension.  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  She relates she continues to have a headache however which occurs off and on has been going on for the past year or so.  She relates that is usually relieved with Tylenol and is low-grade with intensity of 1-2/10.  She is currently on amlodipine 10 mg p.o. daily, lisinopril 40 mg p.o. every morning.  Her weight is holding about the same.    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  Her blood pressure rechecked by me today in the left arm sitting position standard cuff was  136/80.    We will decrease her amlodipine to 5 mg p.o. every morning, continue her lisinopril at 40 mg, and add HCTZ 25 mg one half tab p.o. every morning.  We will look to take her off the amlodipine if her blood pressure and other parameters look good.    We will decrease her amlodipine to 5 mg p.o. every morning      Diagnoses and all orders for this visit:    1. Essential hypertension (Primary)    2. Headaches due to old head injury         Plan:  1.)  Decrease amlodipine to 5 mg p.o. every morning  2.)  Add HCTZ 25 mg one half tab p.o. every morning  3.)  Continue lisinopril 40 mg 1 tab p.o. every morning  4.)  Follow-up in 1 to 2 weeks.    Raymond Saldaña MD  06/14/2021

## 2021-06-28 ENCOUNTER — OFFICE VISIT (OUTPATIENT)
Dept: FAMILY MEDICINE CLINIC | Facility: CLINIC | Age: 61
End: 2021-06-28

## 2021-06-28 VITALS
DIASTOLIC BLOOD PRESSURE: 72 MMHG | SYSTOLIC BLOOD PRESSURE: 130 MMHG | BODY MASS INDEX: 24.44 KG/M2 | HEIGHT: 59 IN | RESPIRATION RATE: 16 BRPM | WEIGHT: 121.2 LBS

## 2021-06-28 DIAGNOSIS — I10 ESSENTIAL HYPERTENSION: Primary | ICD-10-CM

## 2021-06-28 PROCEDURE — 99213 OFFICE O/P EST LOW 20 MIN: CPT | Performed by: INTERNAL MEDICINE

## 2021-06-28 NOTE — PROGRESS NOTES
2021    CC: Hypertension (follow up...no other issues)  .        HPI  Hypertension  This is a chronic problem. The current episode started more than 1 year ago. The problem has been rapidly improving since onset. The problem is controlled. There are no known risk factors for coronary artery disease. Current antihypertension treatment includes ACE inhibitors.        Anam Nieves is a 60 y.o. female.      The following portions of the patient's history were reviewed and updated as appropriate: allergies, current medications, past family history, past medical history, past social history, past surgical history and problem list.    Problem List  Patient Active Problem List   Diagnosis   • Colon cancer screening   • Acute bronchitis with chronic obstructive pulmonary disease (COPD) (CMS/[facility])   • Family history of colon cancer in mother   • Hyperlipidemia   • Hypertension   • Hypokalemia   • Iliotibial band syndrome of right side   • Low back pain radiating to right leg   • Tobacco abuse   • Trochanteric bursitis of right hip       Past Medical History  Past Medical History:   Diagnosis Date   • Hyperlipidemia    • Hypertension        Surgical History  Past Surgical History:   Procedure Laterality Date   •  SECTION         Family History  Family History   Problem Relation Age of Onset   • Heart attack Father    • Hypertension Mother    • Heart disease Sister    • Diabetes Sister        Social History  Social History    Tobacco Use      Smoking status: Current Some Day Smoker        Packs/day: 0.25        Years: 20.00        Pack years: 5      Smokeless tobacco: Never Used       Is the Patient a current tobacco user? No    Allergies  No Known Allergies    Current Medications    Current Outpatient Medications:   •  albuterol sulfate  (90 Base) MCG/ACT inhaler, Inhale 2 puffs., Disp: , Rfl:   •  amLODIPine (NORVASC) 10 MG tablet, TAKE ONE TABLET BY MOUTH EVERY MORNING (Patient taking  differently: 5 mg Daily.), Disp: 30 tablet, Rfl: 3  •  hydroCHLOROthiazide (HYDRODIURIL) 12.5 MG tablet, Take 1 tablet by mouth Daily., Disp: 30 tablet, Rfl: 1  •  ibuprofen (ADVIL,MOTRIN) 600 MG tablet, Take 600 mg by mouth Daily As Needed., Disp: , Rfl:   •  lisinopril (PRINIVIL,ZESTRIL) 40 MG tablet, Take 1 tablet by mouth Daily., Disp: 30 tablet, Rfl: 3  •  MULTIPLE VITAMINS-MINERALS ER PO, Take 1 tablet by mouth Daily., Disp: , Rfl:   •  omeprazole (PrilOSEC) 20 MG capsule, Take 1 capsule by mouth Daily., Disp: 30 capsule, Rfl: 1  •  vitamin E 100 UNIT capsule, Take 100 Units by mouth Daily., Disp: , Rfl:      Review of System  Review of Systems   Constitutional: Negative.    HENT: Negative.    Eyes: Negative.    Respiratory: Negative.    Cardiovascular: Negative.    Gastrointestinal: Negative.      I have reviewed and confirmed the accuracy of the ROS as documented by the MA/LPN/RN Raymond Saldaña MD    Vitals:    06/28/21 1203   BP: 130/72   Resp: 16     Body mass index is 24.48 kg/m².    Objective     Physical Exam  Physical Exam  Cardiovascular:      Rate and Rhythm: Regular rhythm.      Pulses: Normal pulses.      Heart sounds: Normal heart sounds.   Pulmonary:      Effort: Pulmonary effort is normal.      Breath sounds: Normal breath sounds.         Assessment/Plan        This 60-year-old presents at this time for follow-up of hypertension.  With her last visit we decreased her amlodipine from 5 mg to 2.5 mg secondary to her blood pressure.  She relates she is feeling a little better but her blood pressure still is relatively low at 106/70 in the left arm sitting position standard cuff.  She is also on lisinopril 40 mg p.o. daily and HCT 25 mg 1 tab p.o. daily which was her last medication started.    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  This lessen stress may be contributing to her lower blood pressure as well.  In any case we will  discontinue the remaining 2.5 mg of amlodipine look to make further reductions in the medication for hypertension as indicated.    Follow-up in 1 to 2 months.        Diagnoses and all orders for this visit:    1. Essential hypertension (Primary)    Other orders  -     Cologuard - Stool, Per Rectum; Future      Plan:  1.)  DC amlodipine 2.5 mg  2.)  Follow-up in 1 month for reevaluation.       Raymond Saldaña MD  06/28/2021

## 2021-07-06 RX ORDER — LISINOPRIL 40 MG/1
TABLET ORAL
Qty: 30 TABLET | Refills: 2 | Status: SHIPPED | OUTPATIENT
Start: 2021-07-06 | End: 2021-10-04

## 2021-08-13 DIAGNOSIS — I10 ESSENTIAL HYPERTENSION: Primary | ICD-10-CM

## 2021-08-13 RX ORDER — HYDROCHLOROTHIAZIDE 12.5 MG/1
12.5 TABLET ORAL DAILY
Qty: 30 TABLET | Refills: 1 | Status: SHIPPED | OUTPATIENT
Start: 2021-08-13 | End: 2021-10-11

## 2021-08-19 ENCOUNTER — OFFICE VISIT (OUTPATIENT)
Dept: FAMILY MEDICINE CLINIC | Facility: CLINIC | Age: 61
End: 2021-08-19

## 2021-08-19 VITALS
HEIGHT: 59 IN | BODY MASS INDEX: 24.52 KG/M2 | RESPIRATION RATE: 16 BRPM | DIASTOLIC BLOOD PRESSURE: 84 MMHG | SYSTOLIC BLOOD PRESSURE: 130 MMHG | WEIGHT: 121.6 LBS

## 2021-08-19 DIAGNOSIS — F17.200 SMOKER: ICD-10-CM

## 2021-08-19 DIAGNOSIS — I10 ESSENTIAL HYPERTENSION: ICD-10-CM

## 2021-08-19 DIAGNOSIS — R20.2 PARESTHESIA OF BOTH FEET: Primary | ICD-10-CM

## 2021-08-19 DIAGNOSIS — K63.5 POLYP OF CECUM: ICD-10-CM

## 2021-08-19 PROCEDURE — 99214 OFFICE O/P EST MOD 30 MIN: CPT | Performed by: INTERNAL MEDICINE

## 2021-08-19 RX ORDER — PANTOPRAZOLE SODIUM 20 MG/1
TABLET, DELAYED RELEASE ORAL
COMMUNITY
Start: 2021-07-18 | End: 2021-12-28 | Stop reason: SDUPTHER

## 2021-08-20 LAB — VIT B12 SERPL-MCNC: 575 PG/ML (ref 211–946)

## 2021-09-14 ENCOUNTER — OFFICE VISIT (OUTPATIENT)
Dept: OBSTETRICS AND GYNECOLOGY | Facility: CLINIC | Age: 61
End: 2021-09-14

## 2021-09-14 VITALS
BODY MASS INDEX: 24.39 KG/M2 | HEIGHT: 59 IN | SYSTOLIC BLOOD PRESSURE: 158 MMHG | DIASTOLIC BLOOD PRESSURE: 84 MMHG | WEIGHT: 121 LBS

## 2021-09-14 DIAGNOSIS — Z71.6 ENCOUNTER FOR SMOKING CESSATION COUNSELING: Primary | ICD-10-CM

## 2021-09-14 DIAGNOSIS — N95.1 MENOPAUSAL SYMPTOMS: ICD-10-CM

## 2021-09-14 DIAGNOSIS — Z00.00 ANNUAL PHYSICAL EXAM: ICD-10-CM

## 2021-09-14 PROCEDURE — 99396 PREV VISIT EST AGE 40-64: CPT | Performed by: OBSTETRICS & GYNECOLOGY

## 2021-09-14 PROCEDURE — 99212 OFFICE O/P EST SF 10 MIN: CPT | Performed by: OBSTETRICS & GYNECOLOGY

## 2021-09-14 NOTE — PROGRESS NOTES
HALEY Nieves  is a 60 y.o. female who presents for several reasons. First, she would like to have a routine gynecologic exam. Her last mammogram was in 2019. The patient is current with colon cancer screening. Next, she smokes under half pack of cigarettes daily. Next, the patient has hot flashes and night sweats.    Chief Complaint   Patient presents with   • Gynecologic Exam     Patient is here for a annual.       Past Medical History:   Diagnosis Date   • Hyperlipidemia    • Hypertension        Past Surgical History:   Procedure Laterality Date   •  SECTION         Social History     Socioeconomic History   • Marital status:      Spouse name: Not on file   • Number of children: Not on file   • Years of education: Not on file   • Highest education level: Not on file   Tobacco Use   • Smoking status: Current Some Day Smoker     Packs/day: 0.25     Years: 20.00     Pack years: 5.00   • Smokeless tobacco: Never Used   Substance and Sexual Activity   • Alcohol use: Yes     Comment: socially   • Drug use: No   • Sexual activity: Yes     Partners: Male       The following portions of the patient's history were reviewed and updated as appropriate: allergies, current medications, past family history, past medical history, past social history, past surgical history and problem list.    Review of Systems      Patient reports that she is not currently experiencing any symptoms of urinary incontinence.  This is positive for hot flashes and night sweats. It is negative for fever or chills. Negative for nausea or vomiting. Negative for unexplained weight change. All other systems are reviewed and are negative.    Physical Exam  Vitals and nursing note reviewed.   Constitutional:       Appearance: She is well-developed.   HENT:      Head: Normocephalic and atraumatic.   Cardiovascular:      Rate and Rhythm: Normal rate and regular rhythm.   Pulmonary:      Effort: Pulmonary effort is normal.      Breath  sounds: Normal breath sounds. No wheezing or rales.   Chest:      Comments: The breasts are homogeneous.  There are no palpable lumps.  Nipple discharge and axillary adenopathy are absent.  Abdominal:      General: There is no distension.      Palpations: Abdomen is soft.      Tenderness: There is no abdominal tenderness.   Genitourinary:     Labia:         Right: No lesion.         Left: No lesion.       Vagina: Normal. No vaginal discharge.      Cervix: No cervical motion tenderness.      Uterus: Normal. Not enlarged and not tender.       Adnexa:         Right: No mass or tenderness.          Left: No mass or tenderness.     Skin:     General: Skin is warm and dry.   Neurological:      Mental Status: She is alert and oriented to person, place, and time.         Assessment    Diagnoses and all orders for this visit:    1. Encounter for smoking cessation counseling (Primary)  -     IGP, Rfx Aptima HPV ASCU    2. Annual physical exam    3. Menopausal symptoms        Plan  1. Annual examination performed  2. Counseled regarding the importance of regular screening mammograms. Last mammogram was in the year 2019. The patient would like to schedule a mammogram at checkout today.  3. The patient is current with colon cancer screening.  4. Menopausal symptoms. The patient has hot flashes and night sweats. These can interfere with her quality of sleep. We discussed options for management. Patient would like to discuss hormones. The benefits and risks of hormone replacement were discussed with the patient and data from the [facility] were reviewed. I answered the patient's questions. For now, she would like to continue to manage expectantly.    5. No follow-ups on file.    Social History     Tobacco Use   Smoking Status Current Some Day Smoker   • Packs/day: 0.25   • Years: 20.00   • Pack years: 5.00   Raquel Nieves  reports that she has been smoking. She has a 5.00 pack-year smoking history. She has never used smokeless  tobacco.. I have educated her on the risk of diseases from using tobacco products such as COPD and heart disease.     I advised her to quit and she is willing to quit. We have discussed the following method/s for tobacco cessation:  Counseling.  Together we have set a quit date for 1 month from today.  She will follow up with me in 6 weeks or sooner to check on her progress.    I spent 3  minutes counseling the patient.    6.    7.

## 2021-09-22 ENCOUNTER — PROCEDURE VISIT (OUTPATIENT)
Dept: OBSTETRICS AND GYNECOLOGY | Facility: CLINIC | Age: 61
End: 2021-09-22

## 2021-09-22 ENCOUNTER — APPOINTMENT (OUTPATIENT)
Dept: WOMENS IMAGING | Facility: HOSPITAL | Age: 61
End: 2021-09-22

## 2021-09-22 DIAGNOSIS — Z12.31 VISIT FOR SCREENING MAMMOGRAM: Primary | ICD-10-CM

## 2021-09-22 PROCEDURE — 77063 BREAST TOMOSYNTHESIS BI: CPT | Performed by: OBSTETRICS & GYNECOLOGY

## 2021-09-22 PROCEDURE — 77067 SCR MAMMO BI INCL CAD: CPT | Performed by: OBSTETRICS & GYNECOLOGY

## 2021-09-22 PROCEDURE — 77067 SCR MAMMO BI INCL CAD: CPT | Performed by: RADIOLOGY

## 2021-09-22 PROCEDURE — 77063 BREAST TOMOSYNTHESIS BI: CPT | Performed by: RADIOLOGY

## 2021-09-29 ENCOUNTER — TELEPHONE (OUTPATIENT)
Dept: GASTROENTEROLOGY | Facility: CLINIC | Age: 61
End: 2021-09-29

## 2021-09-29 DIAGNOSIS — Z83.71 FAMILY HISTORY OF POLYPS IN THE COLON: ICD-10-CM

## 2021-09-29 DIAGNOSIS — K63.5 POLYP OF COLON, UNSPECIFIED PART OF COLON, UNSPECIFIED TYPE: Primary | ICD-10-CM

## 2021-09-29 DIAGNOSIS — Z80.0 FAMILY HISTORY OF GI MALIGNANCY: ICD-10-CM

## 2021-09-29 NOTE — TELEPHONE ENCOUNTER
Last scope 02/27/19 in epic-- personal and family hx of polyp-- family hx of colon ca-- no ASA or blood thinners-- medications:      hydroCHLOROthiazide (HYDRODIURIL) 12.5 MG tablet  lisinopril (PRINIVIL,ZESTRIL) 40 MG tablet  MULTIPLE VITAMINS-MINERALS ER PO  pantoprazole (PROTONIX) 20 MG EC tablet  vitamin E 100 UNIT capsule    OA form scanned into media

## 2021-10-01 ENCOUNTER — TELEPHONE (OUTPATIENT)
Dept: GASTROENTEROLOGY | Facility: CLINIC | Age: 61
End: 2021-10-01

## 2021-10-04 RX ORDER — LISINOPRIL 40 MG/1
TABLET ORAL
Qty: 30 TABLET | Refills: 2 | Status: SHIPPED | OUTPATIENT
Start: 2021-10-04 | End: 2022-01-03

## 2021-10-04 NOTE — TELEPHONE ENCOUNTER
Rx Refill Note  Requested Prescriptions     Pending Prescriptions Disp Refills   • lisinopril (PRINIVIL,ZESTRIL) 40 MG tablet [Pharmacy Med Name: LISINOPRIL 40 MG TABLET] 30 tablet 2     Sig: TAKE ONE TABLET BY MOUTH DAILY      Last office visit with prescribing clinician: 8/19/2021      Next office visit with prescribing clinician: Visit date not found            Augusto Bronson MA  10/04/21, 15:00 EDT

## 2021-10-11 DIAGNOSIS — I10 ESSENTIAL HYPERTENSION: ICD-10-CM

## 2021-10-11 RX ORDER — HYDROCHLOROTHIAZIDE 12.5 MG/1
TABLET ORAL
Qty: 30 TABLET | Refills: 5 | Status: SHIPPED | OUTPATIENT
Start: 2021-10-11 | End: 2022-04-11

## 2021-10-12 ENCOUNTER — TELEPHONE (OUTPATIENT)
Dept: GASTROENTEROLOGY | Facility: CLINIC | Age: 61
End: 2021-10-12

## 2021-10-13 ENCOUNTER — TELEPHONE (OUTPATIENT)
Dept: GASTROENTEROLOGY | Facility: CLINIC | Age: 61
End: 2021-10-13

## 2021-10-14 ENCOUNTER — TELEPHONE (OUTPATIENT)
Dept: GASTROENTEROLOGY | Facility: CLINIC | Age: 61
End: 2021-10-14

## 2021-10-14 PROBLEM — Z83.719 FAMILY HISTORY OF POLYPS IN THE COLON: Status: ACTIVE | Noted: 2021-10-14

## 2021-10-14 PROBLEM — Z83.71 FAMILY HISTORY OF POLYPS IN THE COLON: Status: ACTIVE | Noted: 2021-10-14

## 2021-10-14 PROBLEM — K63.5 POLYP OF COLON: Status: ACTIVE | Noted: 2021-10-14

## 2021-10-14 NOTE — TELEPHONE ENCOUNTER
HORACE Topete for colonoscopy on 11/18  arrive at 730am   . Mailed Prep instructions to Mailing address on-file. ----[employer]     Private car

## 2021-10-14 NOTE — TELEPHONE ENCOUNTER
HORACE Topete for colonoscopy on 11/18  arrive at 730am   . Mailed Prep instructions to Mailing address on-file. ----[employer]

## 2021-11-18 ENCOUNTER — ANESTHESIA (OUTPATIENT)
Dept: GASTROENTEROLOGY | Facility: HOSPITAL | Age: 61
End: 2021-11-18

## 2021-11-18 ENCOUNTER — HOSPITAL ENCOUNTER (OUTPATIENT)
Facility: HOSPITAL | Age: 61
Setting detail: HOSPITAL OUTPATIENT SURGERY
Discharge: HOME | End: 2021-11-18
Attending: INTERNAL MEDICINE | Admitting: INTERNAL MEDICINE

## 2021-11-18 ENCOUNTER — ANESTHESIA EVENT (OUTPATIENT)
Dept: GASTROENTEROLOGY | Facility: HOSPITAL | Age: 61
End: 2021-11-18

## 2021-11-18 VITALS
HEIGHT: 59 IN | HEART RATE: 77 BPM | TEMPERATURE: 97.1 F | SYSTOLIC BLOOD PRESSURE: 105 MMHG | RESPIRATION RATE: 16 BRPM | OXYGEN SATURATION: 99 % | WEIGHT: 121 LBS | BODY MASS INDEX: 24.39 KG/M2 | DIASTOLIC BLOOD PRESSURE: 61 MMHG

## 2021-11-18 DIAGNOSIS — Z83.71 FAMILY HISTORY OF POLYPS IN THE COLON: ICD-10-CM

## 2021-11-18 DIAGNOSIS — K63.5 POLYP OF COLON, UNSPECIFIED PART OF COLON, UNSPECIFIED TYPE: ICD-10-CM

## 2021-11-18 DIAGNOSIS — Z80.0 FAMILY HISTORY OF GI MALIGNANCY: ICD-10-CM

## 2021-11-18 PROCEDURE — S0260 H&P FOR SURGERY: HCPCS | Performed by: INTERNAL MEDICINE

## 2021-11-18 PROCEDURE — 45380 COLONOSCOPY AND BIOPSY: CPT | Performed by: INTERNAL MEDICINE

## 2021-11-18 PROCEDURE — 25010000002 PROPOFOL 10 MG/ML EMULSION: Performed by: ANESTHESIOLOGY

## 2021-11-18 PROCEDURE — 88305 TISSUE EXAM BY PATHOLOGIST: CPT | Performed by: INTERNAL MEDICINE

## 2021-11-18 RX ORDER — LIDOCAINE HYDROCHLORIDE 20 MG/ML
INJECTION, SOLUTION INFILTRATION; PERINEURAL AS NEEDED
Status: DISCONTINUED | OUTPATIENT
Start: 2021-11-18 | End: 2021-11-18 | Stop reason: SURG

## 2021-11-18 RX ORDER — PROPOFOL 10 MG/ML
VIAL (ML) INTRAVENOUS AS NEEDED
Status: DISCONTINUED | OUTPATIENT
Start: 2021-11-18 | End: 2021-11-18 | Stop reason: SURG

## 2021-11-18 RX ORDER — LIDOCAINE HYDROCHLORIDE 10 MG/ML
0.5 INJECTION, SOLUTION INFILTRATION; PERINEURAL ONCE AS NEEDED
Status: DISCONTINUED | OUTPATIENT
Start: 2021-11-18 | End: 2021-11-18 | Stop reason: HOSPADM

## 2021-11-18 RX ORDER — PROPOFOL 10 MG/ML
VIAL (ML) INTRAVENOUS CONTINUOUS PRN
Status: DISCONTINUED | OUTPATIENT
Start: 2021-11-18 | End: 2021-11-18 | Stop reason: SURG

## 2021-11-18 RX ORDER — SODIUM CHLORIDE, SODIUM LACTATE, POTASSIUM CHLORIDE, CALCIUM CHLORIDE 600; 310; 30; 20 MG/100ML; MG/100ML; MG/100ML; MG/100ML
1000 INJECTION, SOLUTION INTRAVENOUS CONTINUOUS
Status: DISCONTINUED | OUTPATIENT
Start: 2021-11-18 | End: 2021-11-18 | Stop reason: HOSPADM

## 2021-11-18 RX ORDER — SODIUM CHLORIDE 0.9 % (FLUSH) 0.9 %
10 SYRINGE (ML) INJECTION AS NEEDED
Status: DISCONTINUED | OUTPATIENT
Start: 2021-11-18 | End: 2021-11-18 | Stop reason: HOSPADM

## 2021-11-18 RX ADMIN — PROPOFOL 50 MG: 10 INJECTION, EMULSION INTRAVENOUS at 08:51

## 2021-11-18 RX ADMIN — Medication 200 MCG/KG/MIN: at 08:49

## 2021-11-18 RX ADMIN — PROPOFOL 100 MG: 10 INJECTION, EMULSION INTRAVENOUS at 08:49

## 2021-11-18 RX ADMIN — SODIUM CHLORIDE, POTASSIUM CHLORIDE, SODIUM LACTATE AND CALCIUM CHLORIDE 1000 ML: 600; 310; 30; 20 INJECTION, SOLUTION INTRAVENOUS at 07:59

## 2021-11-18 RX ADMIN — LIDOCAINE HYDROCHLORIDE 60 MG: 20 INJECTION, SOLUTION INFILTRATION; PERINEURAL at 08:49

## 2021-11-18 NOTE — ANESTHESIA PREPROCEDURE EVALUATION
Anesthesia Evaluation     Patient summary reviewed and Nursing notes reviewed   no history of anesthetic complications:  NPO Solid Status: > 8 hours  NPO Liquid Status: > 2 hours           Airway   Mallampati: II  Dental      Pulmonary - normal exam   (+) a smoker Current, COPD,   Cardiovascular - normal exam    (+) hypertension 2 medications or greater, hyperlipidemia,       Neuro/Psych- negative ROS  GI/Hepatic/Renal/Endo - negative ROS     Musculoskeletal     Abdominal    Substance History      OB/GYN          Other                        Anesthesia Plan    ASA 2     MAC     intravenous induction     Anesthetic plan, all risks, benefits, and alternatives have been provided, discussed and informed consent has been obtained with: patient.

## 2021-11-18 NOTE — ANESTHESIA POSTPROCEDURE EVALUATION
"Patient: Raquel Nieves    Procedure Summary     Date: 11/18/21 Room / Location:  [facility]    Anesthesia Start: 0845 Anesthesia Stop: 0908    Procedure: COLONOSCOPY to cecum and TI with cold biopsy polypectomies (N/A ) Diagnosis:       Colon polyps      Hemorrhoids      (Polyp of colon, unspecified part of colon, unspecified type [K63.5])      (Family history of GI malignancy [Z80.0])      (Family history of polyps in the colon [Z83.71])    Surgeons: Mustapha Valadez MD Provider: Elias Galindo MD    Anesthesia Type: MAC ASA Status: 2          Anesthesia Type: MAC    Vitals  Vitals Value Taken Time   /61 11/18/21 0927   Temp     Pulse 77 11/18/21 0927   Resp 16 11/18/21 0927   SpO2 99 % 11/18/21 0927           Post Anesthesia Care and Evaluation    Patient location during evaluation: bedside  Patient participation: complete - patient participated  Level of consciousness: awake and alert  Pain management: adequate  Airway patency: patent  Anesthetic complications: No anesthetic complications    Cardiovascular status: acceptable  Respiratory status: acceptable  Hydration status: acceptable    Comments: /61 (BP Location: Left arm, Patient Position: Lying)   Pulse 77   Temp 36.2 °C (97.1 °F) (Oral)   Resp 16   Ht 149.9 cm (59\")   Wt 54.9 kg (121 lb)   LMP  (LMP Unknown)   SpO2 99%   BMI 24.44 kg/m²       "

## 2021-11-18 NOTE — H&P
"[facility] ([facility]  Pre Procedure History & Physical    Chief Complaint:   Polyps, family history, positive Cologuard    Subjective     HPI:   60-year-old female presents the endoscopy suite for colonoscopic examination. She carries a personal history of polyps and a family history of polyps and colon cancer. She recently tested positive for Cologuard. Per records last colonoscopy was in 2019.    Past Medical History:   Past Medical History:   Diagnosis Date   • Family history of colon cancer    • Hyperlipidemia    • Hypertension        Past Surgical History:  Past Surgical History:   Procedure Laterality Date   •  SECTION     • COLONOSCOPY         Family History:  Family History   Problem Relation Age of Onset   • Heart attack Father    • Hypertension Mother    • Heart disease Sister    • Diabetes Sister    • Malig Hyperthermia Neg Hx        Social History:   reports that she has been smoking. She has a 5.00 pack-year smoking history. She has never used smokeless tobacco. She reports current alcohol use. She reports that she does not use drugs.    Medications:   Medications Prior to Admission   Medication Sig Dispense Refill Last Dose   • albuterol sulfate  (90 Base) MCG/ACT inhaler Inhale 2 puffs.      • hydroCHLOROthiazide (HYDRODIURIL) 12.5 MG tablet TAKE ONE TABLET BY MOUTH DAILY 30 tablet 5    • ibuprofen (ADVIL,MOTRIN) 600 MG tablet Take 600 mg by mouth Daily As Needed.      • lisinopril (PRINIVIL,ZESTRIL) 40 MG tablet TAKE ONE TABLET BY MOUTH DAILY 30 tablet 2    • MULTIPLE VITAMINS-MINERALS ER PO Take 1 tablet by mouth Daily.      • pantoprazole (PROTONIX) 20 MG EC tablet       • vitamin E 100 UNIT capsule Take 100 Units by mouth Daily.          Allergies:  Patient has no known allergies.    ROS:    Pertinent items are noted in HPI, all other systems reviewed and negative     Objective     Height 149.9 cm (59\"), weight 53.1 kg (117 lb), not currently " breastfeeding.    Physical Exam   Constitutional: Pt is oriented to person, place, and time and well-developed, well-nourished, and in no distress.   Mouth/Throat: Oropharynx is clear and moist.   Neck: Normal range of motion.   Cardiovascular: Normal rate, regular rhythm and normal heart sounds.    Pulmonary/Chest: Effort normal and breath sounds normal.   Abdominal: Soft. Nontender  Skin: Skin is warm and dry.   Psychiatric: Mood, memory, affect and judgment normal.     Assessment/Plan     Diagnosis:  Polyps  Family history  Positive Cologuard    Anticipated Surgical Procedure:  Colonoscopy    The risks, benefits, and alternatives of this procedure have been discussed with the patient or the responsible party- the patient understands and agrees to proceed.

## 2021-11-19 LAB
LAB AP CASE REPORT: NORMAL
PATH REPORT.FINAL DX SPEC: NORMAL
PATH REPORT.GROSS SPEC: NORMAL

## 2021-12-07 ENCOUNTER — TELEPHONE (OUTPATIENT)
Dept: GASTROENTEROLOGY | Facility: CLINIC | Age: 61
End: 2021-12-07

## 2021-12-07 NOTE — TELEPHONE ENCOUNTER
Called pt and advised of Dr Valadez's note. Verb understanding.     C/s placed in recall and hm for 11/18/2026.

## 2021-12-07 NOTE — TELEPHONE ENCOUNTER
----- Message from Mustapha CHEN MD sent at 12/5/2021  3:15 PM EST -----  Regarding: Biopsy results  Okay to call results, essentially benign.  Would offer follow-up colonoscopy in 5 years time.  Office follow-up sooner as needed.  ----- Message -----  From: Lab, Background User  Sent: 11/19/2021   9:54 AM EST  To: Mustapha CHEN MD

## 2021-12-28 RX ORDER — PANTOPRAZOLE SODIUM 20 MG/1
20 TABLET, DELAYED RELEASE ORAL DAILY
Qty: 30 TABLET | Refills: 5 | Status: SHIPPED | OUTPATIENT
Start: 2021-12-28 | End: 2022-09-02

## 2022-01-03 RX ORDER — LISINOPRIL 40 MG/1
TABLET ORAL
Qty: 30 TABLET | Refills: 2 | Status: SHIPPED | OUTPATIENT
Start: 2022-01-03 | End: 2022-04-04

## 2022-01-12 ENCOUNTER — TELEPHONE (OUTPATIENT)
Dept: FAMILY MEDICINE CLINIC | Facility: CLINIC | Age: 62
End: 2022-01-12

## 2022-01-12 NOTE — TELEPHONE ENCOUNTER
PATIENT'S  SARA CALLED AND STATES SHE TOOK A COVID HOME TEST AND IT IS NEGATIVE.HER HUSBANDS TEST CAME BACK POSITIVE     SHE IS FULLY VACCINATED WITH HER BOOSTER.    SHE HAS A LITTLE COUGH    SHE IS WANTING TO KNOW WHAT SHE HAS TO DO.  PLEASE CALL [phone]    [address] [facility] [address]  - [phone]  - [phone]   [phone]

## 2022-01-12 NOTE — TELEPHONE ENCOUNTER
Spoke with pts , advised that because he is positive and she is having symptoms we can assume she is positive as well, the at home test may not have detected it. Advised that she may come to the office for a PCR test if she would like. Advised go to ER if she gets SOA, high fever, or chest pain.

## 2022-01-13 ENCOUNTER — DOCUMENTATION (OUTPATIENT)
Dept: FAMILY MEDICINE CLINIC | Facility: CLINIC | Age: 62
End: 2022-01-13

## 2022-01-13 ENCOUNTER — TELEPHONE (OUTPATIENT)
Dept: FAMILY MEDICINE CLINIC | Facility: CLINIC | Age: 62
End: 2022-01-13

## 2022-01-13 RX ORDER — ALBUTEROL SULFATE 90 UG/1
2 AEROSOL, METERED RESPIRATORY (INHALATION)
Qty: 18 G | Refills: 1 | Status: SHIPPED | OUTPATIENT
Start: 2022-01-13 | End: 2023-01-24

## 2022-01-14 NOTE — TELEPHONE ENCOUNTER
I talked with the patient regarding her 's positive COVID test.  His home test was positive as of 1/11.  I have asked him to quarantine for 5 days and for her to quarantine for 5 days and then on day 5 we will get a COVID-19 test for her.  Her job also requires that she have a negative test before returning to work.

## 2022-01-17 ENCOUNTER — CLINICAL SUPPORT (OUTPATIENT)
Dept: FAMILY MEDICINE CLINIC | Facility: CLINIC | Age: 62
End: 2022-01-17

## 2022-01-17 DIAGNOSIS — Z20.822 CONTACT WITH AND (SUSPECTED) EXPOSURE TO COVID-19: Primary | ICD-10-CM

## 2022-01-19 ENCOUNTER — TELEPHONE (OUTPATIENT)
Dept: FAMILY MEDICINE CLINIC | Facility: CLINIC | Age: 62
End: 2022-01-19

## 2022-01-19 LAB
LABCORP SARS-COV-2, NAA 2 DAY TAT: NORMAL
SARS-COV-2 RNA RESP QL NAA+PROBE: NOT DETECTED

## 2022-03-30 ENCOUNTER — OFFICE VISIT (OUTPATIENT)
Dept: FAMILY MEDICINE CLINIC | Facility: CLINIC | Age: 62
End: 2022-03-30

## 2022-03-30 VITALS
RESPIRATION RATE: 16 BRPM | SYSTOLIC BLOOD PRESSURE: 140 MMHG | DIASTOLIC BLOOD PRESSURE: 80 MMHG | BODY MASS INDEX: 24.39 KG/M2 | WEIGHT: 121 LBS | TEMPERATURE: 98.6 F | HEIGHT: 59 IN

## 2022-03-30 DIAGNOSIS — R05.9 COUGH: Primary | ICD-10-CM

## 2022-03-30 LAB
EXPIRATION DATE: NORMAL
FLUAV AG UPPER RESP QL IA.RAPID: NOT DETECTED
FLUBV AG UPPER RESP QL IA.RAPID: NOT DETECTED
INTERNAL CONTROL: NORMAL
Lab: NORMAL
SARS-COV-2 AG UPPER RESP QL IA.RAPID: NOT DETECTED

## 2022-03-30 PROCEDURE — 99214 OFFICE O/P EST MOD 30 MIN: CPT | Performed by: INTERNAL MEDICINE

## 2022-03-30 PROCEDURE — 87428 SARSCOV & INF VIR A&B AG IA: CPT | Performed by: INTERNAL MEDICINE

## 2022-03-30 RX ORDER — AZITHROMYCIN 250 MG/1
TABLET, FILM COATED ORAL
Qty: 6 TABLET | Refills: 0 | Status: SHIPPED | OUTPATIENT
Start: 2022-03-30 | End: 2022-04-26

## 2022-04-03 NOTE — PROGRESS NOTES
2022    CC: Sinusitis (Productive cough, runny nose, sneezing.  Started 4 days ago.)  .        HPI  Sinusitis  This is a recurrent problem. The current episode started more than 1 year ago. The problem is unchanged. There has been no fever. The fever has been present for 5 days or more. Her pain is at a severity of 2/10. The pain is mild. Associated symptoms include coughing and sinus pressure. Pertinent negatives include no chills or congestion.        Anam Nieves is a 61 y.o. female.      The following portions of the patient's history were reviewed and updated as appropriate: allergies, current medications, past family history, past medical history, past social history, past surgical history and problem list.    Problem List  Patient Active Problem List   Diagnosis   • Colon cancer screening   • Acute bronchitis with chronic obstructive pulmonary disease (COPD) (HCC)   • Family history of colon cancer in mother   • Hyperlipidemia   • Hypertension   • Hypokalemia   • Iliotibial band syndrome of right side   • Low back pain radiating to right leg   • Tobacco abuse   • Trochanteric bursitis of right hip   • Polyp of colon   • Family history of polyps in the colon       Past Medical History  Past Medical History:   Diagnosis Date   • Family history of colon cancer    • Hyperlipidemia    • Hypertension        Surgical History  Past Surgical History:   Procedure Laterality Date   •  SECTION     • COLONOSCOPY     • COLONOSCOPY N/A 2021    Procedure: COLONOSCOPY to cecum and TI with cold biopsy polypectomies;  Surgeon: Mustapha Valadez MD;  Location: [facility] END[facility]COPY;  Service: Gastroenterology;  Laterality: N/A;  pre: family h/o colon cancer and personal h/o colon polyps, positive cologuard  post: polyps, hemorrhoids       Family History  Family History   Problem Relation Age of Onset   • Heart attack Father    • Hypertension Mother    • Heart disease Sister    • Diabetes  Sister    • Malig Hyperthermia Neg Hx        Social History  Social History    Tobacco Use      Smoking status: Current Some Day Smoker        Packs/day: 0.25        Years: 20.00        Pack years: 5      Smokeless tobacco: Never Used       Is the Patient a current tobacco user? No    Allergies  No Known Allergies    Current Medications    Current Outpatient Medications:   •  albuterol sulfate  (90 Base) MCG/ACT inhaler, Inhale 2 puffs 4 (Four) Times a Day., Disp: 18 g, Rfl: 1  •  hydroCHLOROthiazide (HYDRODIURIL) 12.5 MG tablet, TAKE ONE TABLET BY MOUTH DAILY, Disp: 30 tablet, Rfl: 5  •  ibuprofen (ADVIL,MOTRIN) 600 MG tablet, Take 600 mg by mouth Daily As Needed., Disp: , Rfl:   •  lisinopril (PRINIVIL,ZESTRIL) 40 MG tablet, TAKE ONE TABLET BY MOUTH DAILY, Disp: 30 tablet, Rfl: 2  •  MULTIPLE VITAMINS-MINERALS ER PO, Take 1 tablet by mouth Daily., Disp: , Rfl:   •  pantoprazole (PROTONIX) 20 MG EC tablet, Take 1 tablet by mouth Daily., Disp: 30 tablet, Rfl: 5  •  vitamin E 100 UNIT capsule, Take 100 Units by mouth Daily., Disp: , Rfl:   •  azithromycin (Zithromax Z-Ezra) 250 MG tablet, Take 2 tablets by mouth on day 1, then 1 tablet daily on days 2-5, Disp: 6 tablet, Rfl: 0     Review of System  Review of Systems   Constitutional: Negative for chills.   HENT: Positive for sinus pressure and voice change. Negative for congestion and tinnitus.    Eyes: Negative.    Respiratory: Positive for cough.      I have reviewed and confirmed the accuracy of the ROS as documented by the MA/LPN/RN Raymond Saldaña MD    Vitals:    03/30/22 1100   BP: 140/80   Resp: 16   Temp: 98.6 °F (37 °C)     Body mass index is 24.44 kg/m².    Objective     Physical Exam  Physical Exam  Constitutional:       Appearance: Normal appearance.   HENT:      Head: Normocephalic and atraumatic.   Eyes:      Extraocular Movements: Extraocular movements intact.      Pupils: Pupils are equal, round, and reactive to light.   Cardiovascular:       Rate and Rhythm: Normal rate and regular rhythm.   Pulmonary:      Effort: Pulmonary effort is normal.      Breath sounds: Normal breath sounds.   Neurological:      Mental Status: She is alert.         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  She relates it mainly it is nonproductive but he is is noticed some yellowish to whitish sputum over the past few days.  we noticed a voice change as well.  She has tenderness to percussion of the maxillary sinuses.    Patient has a history of hypertension and we note that her blood pressure is slightly elevated today at 140/80 in the left arm sitting position standard cuff.  She had a high sodium meal last night.  She relates she is taking her medication as prescribed.    She recently had an at-home COVID-19 test done which was negative.  A repeat one here in the office also was negative.        Diagnoses and all orders for this visit:    1. Cough (Primary)  -     POCT SARS-CoV-2 Antigen JJ    Other orders  -     azithromycin (Zithromax Z-Ezra) 250 MG tablet; Take 2 tablets by mouth on day 1, then 1 tablet daily on days 2-5  Dispense: 6 tablet; Refill: 0             Raymond Saldaña MD  03/30/2022

## 2022-04-04 RX ORDER — LISINOPRIL 40 MG/1
TABLET ORAL
Qty: 30 TABLET | Refills: 2 | Status: SHIPPED | OUTPATIENT
Start: 2022-04-04 | End: 2022-07-05

## 2022-04-10 DIAGNOSIS — I10 ESSENTIAL HYPERTENSION: ICD-10-CM

## 2022-04-11 RX ORDER — HYDROCHLOROTHIAZIDE 12.5 MG/1
TABLET ORAL
Qty: 30 TABLET | Refills: 5 | Status: SHIPPED | OUTPATIENT
Start: 2022-04-11 | End: 2022-05-23

## 2022-04-26 ENCOUNTER — OFFICE VISIT (OUTPATIENT)
Dept: FAMILY MEDICINE CLINIC | Facility: CLINIC | Age: 62
End: 2022-04-26

## 2022-04-26 VITALS
SYSTOLIC BLOOD PRESSURE: 130 MMHG | HEIGHT: 59 IN | BODY MASS INDEX: 24.8 KG/M2 | WEIGHT: 123 LBS | DIASTOLIC BLOOD PRESSURE: 62 MMHG

## 2022-04-26 DIAGNOSIS — F17.200 SMOKER: ICD-10-CM

## 2022-04-26 DIAGNOSIS — I10 ESSENTIAL HYPERTENSION: Primary | Chronic | ICD-10-CM

## 2022-04-26 PROCEDURE — 99214 OFFICE O/P EST MOD 30 MIN: CPT | Performed by: INTERNAL MEDICINE

## 2022-04-30 NOTE — PROGRESS NOTES
Answers for HPI/ROS submitted by the patient on 2022  What is the primary reason for your visit?: High Blood Pressure    Answers for HPI/ROS submitted by the patient on 2022  What is the primary reason for your visit?: High Blood Pressure    2022    CC: Hypertension (F/U..No other issues) and Nicotine Dependence  .        HPI  Hypertension  This is a chronic problem. The current episode started more than 1 year ago. The problem is unchanged. The problem is uncontrolled. Associated symptoms include anxiety. Past treatments include ACE inhibitors, calcium channel blockers and diuretics. Current antihypertension treatment includes diuretics and ACE inhibitors. The current treatment provides mild improvement.        Anam Nieves is a 61 y.o. female.      The following portions of the patient's history were reviewed and updated as appropriate: allergies, current medications, past family history, past medical history, past social history, past surgical history and problem list.    Problem List  Patient Active Problem List   Diagnosis   • Colon cancer screening   • Acute bronchitis with chronic obstructive pulmonary disease (COPD) (HCC)   • Family history of colon cancer in mother   • Hyperlipidemia   • Hypertension   • Hypokalemia   • Iliotibial band syndrome of right side   • Low back pain radiating to right leg   • Tobacco abuse   • Trochanteric bursitis of right hip   • Polyp of colon   • Family history of polyps in the colon       Past Medical History  Past Medical History:   Diagnosis Date   • Family history of colon cancer    • Hyperlipidemia    • Hypertension        Surgical History  Past Surgical History:   Procedure Laterality Date   •  SECTION     • COLONOSCOPY     • COLONOSCOPY N/A 2021    Procedure: COLONOSCOPY to cecum and TI with cold biopsy polypectomies;  Surgeon: Mustapha Valadez MD;  Location: [facility] ENDOSCOPY;  Service: Gastroenterology;  Laterality:  N/A;  pre: family h/o colon cancer and personal h/o colon polyps, positive cologuard  post: polyps, hemorrhoids       Family History  Family History   Problem Relation Age of Onset   • Heart attack Father    • Hypertension Mother    • Heart disease Sister    • Diabetes Sister    • Malig Hyperthermia Neg Hx        Social History  Social History    Tobacco Use      Smoking status: Current Some Day Smoker        Packs/day: 0.25        Years: 20.00        Pack years: 5      Smokeless tobacco: Never Used       Is the Patient a current tobacco user? No    Allergies  No Known Allergies    Current Medications    Current Outpatient Medications:   •  albuterol sulfate  (90 Base) MCG/ACT inhaler, Inhale 2 puffs 4 (Four) Times a Day., Disp: 18 g, Rfl: 1  •  hydroCHLOROthiazide (HYDRODIURIL) 12.5 MG tablet, TAKE ONE TABLET BY MOUTH DAILY, Disp: 30 tablet, Rfl: 5  •  ibuprofen (ADVIL,MOTRIN) 600 MG tablet, Take 600 mg by mouth Daily As Needed., Disp: , Rfl:   •  lisinopril (PRINIVIL,ZESTRIL) 40 MG tablet, TAKE ONE TABLET BY MOUTH DAILY, Disp: 30 tablet, Rfl: 2  •  MULTIPLE VITAMINS-MINERALS ER PO, Take 1 tablet by mouth Daily., Disp: , Rfl:   •  pantoprazole (PROTONIX) 20 MG EC tablet, Take 1 tablet by mouth Daily., Disp: 30 tablet, Rfl: 5  •  vitamin E 100 UNIT capsule, Take 100 Units by mouth Daily., Disp: , Rfl:      Review of System  Review of Systems   Eyes: Negative.    Respiratory: Negative.    Cardiovascular: Negative.    Gastrointestinal: Negative.    Endocrine: Negative.    Genitourinary: Negative.      I have reviewed and confirmed the accuracy of the ROS as documented by the MA/LPN/RN Raymond Saldaña MD    Vitals:    04/26/22 1135   BP: 130/62     Body mass index is 24.84 kg/m².    Objective     Physical Exam  Physical Exam  HENT:      Head: Atraumatic.      Nose: Nose normal.   Eyes:      Extraocular Movements: Extraocular movements intact.   Cardiovascular:      Rate and Rhythm: Normal rate and regular  rhythm.   Musculoskeletal:      Cervical back: Normal range of motion.   Neurological:      Mental Status: She is alert.         Assessment/Plan    This 61-year-old patient presents at this time for follow-up of hypertension.  Her blood pressure was initially 130/62 but on recheck by me in the left arm sitting position standard cuff was 138/86.  She is currently on HCTZ 12.5 mg 1 tab p.o. daily and lisinopril 40 mg p.o. daily.  She used amlodipine at one time but complained that her legs were swelling and so the medication was discontinued.  She relates that she gets regular exercise by walking her dogs but relates that of late she had decreased this activity.    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    After discussion she agrees to start a tapering of her less than 1 pack cigarettes per day by decreasing about 2 to 3 cigarettes/week.  We will see the patient after about 2 months and reevaluate her progress.  Diagnoses and all orders for this visit:    1. Essential hypertension (Primary)  Comments:  Poorly controlled    2. Smoker  Comments:  Patient to start tapering program      Plan:  1.)  Increase HCTZ to 25 mg 1 tab p.o. every morning  2.)  The patient will start a tapering program by decreasing her amount of cigarettes smoked per day by decreasing every week.  3.)  Follow-up in the next several months.       Raymond Saldaña MD  04/26/2022

## 2022-05-23 DIAGNOSIS — I10 ESSENTIAL HYPERTENSION: ICD-10-CM

## 2022-05-23 RX ORDER — HYDROCHLOROTHIAZIDE 25 MG/1
25 TABLET ORAL DAILY
COMMUNITY
End: 2022-05-23 | Stop reason: SDUPTHER

## 2022-05-23 RX ORDER — HYDROCHLOROTHIAZIDE 25 MG/1
25 TABLET ORAL DAILY
Qty: 30 TABLET | Refills: 5 | Status: SHIPPED | OUTPATIENT
Start: 2022-05-23 | End: 2022-11-17

## 2022-05-23 NOTE — TELEPHONE ENCOUNTER
PATIENT CALLED NEEDING A NEW PRESCRIPTION OF     hydroCHLOROthiazide (HYDRODIURIL) 12.5 MG tablet    SHE WAS TAKING ONE TABLET A DAY THEN IT WAS INCREASED TO TWO TABLETS A DAY. SHE RAN OUT OF HER OLD PRESCRIPTION.   OUT OF MEDICATION    [place] [address] [facility] [address]  - [phone]  - [phone]   [phone]    CALL BACK NUMBER [phone]

## 2022-06-02 ENCOUNTER — OFFICE VISIT (OUTPATIENT)
Dept: FAMILY MEDICINE CLINIC | Facility: CLINIC | Age: 62
End: 2022-06-02

## 2022-06-02 VITALS
HEIGHT: 59 IN | DIASTOLIC BLOOD PRESSURE: 70 MMHG | RESPIRATION RATE: 16 BRPM | BODY MASS INDEX: 23.95 KG/M2 | SYSTOLIC BLOOD PRESSURE: 120 MMHG | WEIGHT: 118.8 LBS

## 2022-06-02 DIAGNOSIS — Z71.6 ENCOUNTER FOR SMOKING CESSATION COUNSELING: Primary | ICD-10-CM

## 2022-06-02 DIAGNOSIS — I10 ESSENTIAL HYPERTENSION: Chronic | ICD-10-CM

## 2022-06-02 PROCEDURE — 99213 OFFICE O/P EST LOW 20 MIN: CPT | Performed by: INTERNAL MEDICINE

## 2022-06-07 NOTE — PROGRESS NOTES
This pleasant 61-year-old presents at this time for follow-up of hypertension and nicotine counseling.  She relates she is feeling better than her last visit she has become more encouraged with the success that she has had in decreasing her cigarette consumption.  2022    CC: Hypertension (F/U) and Nicotine Dependence (F/U...No other issues)  .        HPI  History of Present Illness     Subjective   Raquel Nieves is a 61 y.o. female.      The following portions of the patient's history were reviewed and updated as appropriate: allergies, current medications, past family history, past medical history, past social history, past surgical history and problem list.    Problem List  Patient Active Problem List   Diagnosis   • Colon cancer screening   • Acute bronchitis with chronic obstructive pulmonary disease (COPD) (HCC)   • Family history of colon cancer in mother   • Hyperlipidemia   • Hypertension   • Hypokalemia   • Iliotibial band syndrome of right side   • Low back pain radiating to right leg   • Tobacco abuse   • Trochanteric bursitis of right hip   • Polyp of colon   • Family history of polyps in the colon       Past Medical History  Past Medical History:   Diagnosis Date   • Family history of colon cancer    • Hyperlipidemia    • Hypertension        Surgical History  Past Surgical History:   Procedure Laterality Date   •  SECTION     • COLONOSCOPY     • COLONOSCOPY N/A 2021    Procedure: COLONOSCOPY to cecum and TI with cold biopsy polypectomies;  Surgeon: Mustapha Valadez MD;  Location: [facility] ENDOSCOPY;  Service: Gastroenterology;  Laterality: N/A;  pre: family h/o colon cancer and personal h/o colon polyps, positive cologuard  post: polyps, hemorrhoids       Family History  Family History   Problem Relation Age of Onset   • Heart attack Father    • Hypertension Mother    • Heart disease Sister    • Diabetes Sister    • Malig Hyperthermia Neg Hx        Social History  Social  History    Tobacco Use      Smoking status: Current Some Day Smoker        Packs/day: 0.25        Years: 20.00        Pack years: 5      Smokeless tobacco: Never Used       Is the Patient a current tobacco user? No    Allergies  No Known Allergies    Current Medications    Current Outpatient Medications:   •  albuterol sulfate  (90 Base) MCG/ACT inhaler, Inhale 2 puffs 4 (Four) Times a Day., Disp: 18 g, Rfl: 1  •  hydroCHLOROthiazide (HYDRODIURIL) 25 MG tablet, Take 1 tablet by mouth Daily., Disp: 30 tablet, Rfl: 5  •  ibuprofen (ADVIL,MOTRIN) 600 MG tablet, Take 600 mg by mouth Daily As Needed., Disp: , Rfl:   •  lisinopril (PRINIVIL,ZESTRIL) 40 MG tablet, TAKE ONE TABLET BY MOUTH DAILY, Disp: 30 tablet, Rfl: 2  •  MULTIPLE VITAMINS-MINERALS ER PO, Take 1 tablet by mouth Daily., Disp: , Rfl:   •  pantoprazole (PROTONIX) 20 MG EC tablet, Take 1 tablet by mouth Daily., Disp: 30 tablet, Rfl: 5  •  vitamin E 100 UNIT capsule, Take 100 Units by mouth Daily., Disp: , Rfl:      Review of System  Review of Systems   Constitutional: Negative.    HENT: Negative.    Eyes: Negative.    Respiratory: Negative.    Cardiovascular: Negative.    Gastrointestinal: Negative.      I have reviewed and confirmed the accuracy of the ROS as documented by the MA/LPN/RN Raymond Saldaña MD    Vitals:    06/02/22 0917   BP: 120/70   Resp: 16     Body mass index is 23.99 kg/m².    Objective     Physical Exam  Physical Exam  Constitutional:       Appearance: Normal appearance.   Cardiovascular:      Rate and Rhythm: Normal rate and regular rhythm.      Pulses: Normal pulses.      Heart sounds: Normal heart sounds.   Pulmonary:      Effort: Pulmonary effort is normal.      Breath sounds: Normal breath sounds.   Neurological:      Mental Status: She is alert.         Assessment & Plan    Patient's blood pressure is much improved from the past down to 120/70 in the left arm sitting position standard cuff.  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  She is working a part-time job and is getting some enjoyment from that.    Will use a technique of tapering her cigarette consumption and making it more difficult for her to have readily accessible cigarettes.  She feels that this is helpful and that she has been able to decrease her cigarette consumption down to 6/day.  She is giving Nicorette gum to try although she feels that it may be difficult to continue because of its extremely poor taste.  I answered questions regarding Chantix particular the pros and cons.  At this point I feel we should continue with the tapering as she is making progress.  She is placing her cigarettes in an inaccessible place and her  is also making it difficult for her to exceed her cigarette maximum helping to keep her cigarettes away as well.  Next week she will decrease down to 5 cigarettes and will consider cutting back 1 or 2 a week as she is able to tolerate.    We will see her back for follow-up in 2 months.    Diagnoses and all orders for this visit:    1. Encounter for smoking cessation counseling (Primary)    2. Essential hypertension             Raymond Saldaña MD  06/02/2022

## 2022-07-05 RX ORDER — LISINOPRIL 40 MG/1
TABLET ORAL
Qty: 30 TABLET | Refills: 2 | Status: SHIPPED | OUTPATIENT
Start: 2022-07-05 | End: 2022-10-06

## 2022-08-02 ENCOUNTER — OFFICE VISIT (OUTPATIENT)
Dept: FAMILY MEDICINE CLINIC | Facility: CLINIC | Age: 62
End: 2022-08-02

## 2022-08-02 VITALS
BODY MASS INDEX: 24.31 KG/M2 | WEIGHT: 120.6 LBS | DIASTOLIC BLOOD PRESSURE: 68 MMHG | HEIGHT: 59 IN | RESPIRATION RATE: 16 BRPM | SYSTOLIC BLOOD PRESSURE: 110 MMHG

## 2022-08-02 DIAGNOSIS — F17.200 SMOKER: ICD-10-CM

## 2022-08-02 DIAGNOSIS — G47.20 SLEEP-WAKE 24 HOUR CYCLE DISRUPTION: ICD-10-CM

## 2022-08-02 DIAGNOSIS — I10 ESSENTIAL HYPERTENSION: Primary | ICD-10-CM

## 2022-08-02 PROCEDURE — 99213 OFFICE O/P EST LOW 20 MIN: CPT | Performed by: INTERNAL MEDICINE

## 2022-08-02 NOTE — PROGRESS NOTES
2022    CC: Nicotine Dependence (F/U) and Hypertension (F/U.  Has occas headaches.---no other issues)  .        HPI  This pleasant patient presents at this time for follow-up of hypertension and smoking cessation.  She relates that she is taking her blood pressure medicine as prescribed.  She relates that she awakens with occasional headache is relieved with Tylenol or Aleve.       Subjective   Raquel Nieves is a 61 y.o. female.      The following portions of the patient's history were reviewed and updated as appropriate: allergies, current medications, past family history, past medical history, past social history, past surgical history and problem list.    Problem List  Patient Active Problem List   Diagnosis   • Colon cancer screening   • Acute bronchitis with chronic obstructive pulmonary disease (COPD) (HCC)   • Family history of colon cancer in mother   • Hyperlipidemia   • Hypertension   • Hypokalemia   • Iliotibial band syndrome of right side   • Low back pain radiating to right leg   • Tobacco abuse   • Trochanteric bursitis of right hip   • Polyp of colon   • Family history of polyps in the colon       Past Medical History  Past Medical History:   Diagnosis Date   • Family history of colon cancer    • Hyperlipidemia    • Hypertension        Surgical History  Past Surgical History:   Procedure Laterality Date   •  SECTION     • COLONOSCOPY     • COLONOSCOPY N/A 2021    Procedure: COLONOSCOPY to cecum and TI with cold biopsy polypectomies;  Surgeon: Mustapha Valdaez MD;  Location: [facility] ENDOSCOPY;  Service: Gastroenterology;  Laterality: N/A;  pre: family h/o colon cancer and personal h/o colon polyps, positive cologuard  post: polyps, hemorrhoids       Family History  Family History   Problem Relation Age of Onset   • Heart attack Father    • Hypertension Mother    • Heart disease Sister    • Diabetes Sister    • Malig Hyperthermia Neg Hx        Social History  Social History     Tobacco Use      Smoking status: Current Some Day Smoker        Packs/day: 0.25        Years: 20.00        Pack years: 5      Smokeless tobacco: Never Used       Is the Patient a current tobacco user? No    Allergies  No Known Allergies    Current Medications    Current Outpatient Medications:   •  albuterol sulfate  (90 Base) MCG/ACT inhaler, Inhale 2 puffs 4 (Four) Times a Day., Disp: 18 g, Rfl: 1  •  hydroCHLOROthiazide (HYDRODIURIL) 25 MG tablet, Take 1 tablet by mouth Daily., Disp: 30 tablet, Rfl: 5  •  ibuprofen (ADVIL,MOTRIN) 600 MG tablet, Take 600 mg by mouth Daily As Needed., Disp: , Rfl:   •  lisinopril (PRINIVIL,ZESTRIL) 40 MG tablet, TAKE ONE TABLET BY MOUTH DAILY, Disp: 30 tablet, Rfl: 2  •  MULTIPLE VITAMINS-MINERALS ER PO, Take 1 tablet by mouth Daily., Disp: , Rfl:   •  pantoprazole (PROTONIX) 20 MG EC tablet, Take 1 tablet by mouth Daily., Disp: 30 tablet, Rfl: 5  •  vitamin E 100 UNIT capsule, Take 100 Units by mouth Daily., Disp: , Rfl:      Review of System  Review of Systems   HENT: Negative.    Eyes: Negative.    Respiratory: Negative.    Cardiovascular: Negative.    Gastrointestinal: Negative.      I have reviewed and confirmed the accuracy of the ROS as documented by the MA/LPN/RN Raymond Saldaña MD    Vitals:    08/02/22 1004   BP: 110/68   Resp: 16     Body mass index is 24.36 kg/m².    Objective     Physical Exam  Physical Exam  HENT:      Head: Normocephalic.   Cardiovascular:      Rate and Rhythm: Normal rate and regular rhythm.      Pulses: Normal pulses.      Heart sounds: Normal heart sounds.   Pulmonary:      Effort: Pulmonary effort is normal.      Breath sounds: Normal breath sounds.         Assessment & Plan    This pleasant patient presents at this time for follow-up of hypertension.  She relates she is taking her blood pressure medicine as prescribed.  She has had no syncopal episodes.  We have counseled her regarding nicotine cessation and she was down to 5 cigarettes  a day but she relates that she is back up to 7.  She acknowledges that she needs to get back to chewing gum and making the cigarette access more difficult as she had done in the past 2 return to her previous success.    Patient also complains of waking up in the middle of the night sometimes not being able to go to sleep she is not under any unusual stress.  She completed a PhD program several months ago which originally was attributable to some of her stress and waking up at night etc.  I will refer her into sleep medicine for evaluation.    Her headaches are very mild and infrequent and relieved easily with Tylenol or Aleve.    Her blood pressure is well controlled today at 110/68 in the left arm sitting position standard cuff.  We note that her weight is up to 120 pounds from her prior 118.    Diagnoses and all orders for this visit:    1. Essential hypertension (Primary)    2. Smoker    3. Headaches due to old head injury         Plan:  1.)  Referral to sleep medicine for evaluation  2.)  Follow-up in 3 to 4 months with physical examination.  3.)  We encouraged the patient to decrease her cigarette consumption.  She is to resume her previously successful techniques of making difficult access to her cigarettes and setting a maximum goal per day that decreases weekly.    Raymond Saldaña MD  08/02/2022  Answers for HPI/ROS submitted by the patient on 8/1/2022  What is the primary reason for your visit?: High Blood Pressure

## 2022-08-10 ENCOUNTER — OFFICE VISIT (OUTPATIENT)
Dept: SLEEP MEDICINE | Facility: HOSPITAL | Age: 62
End: 2022-08-10

## 2022-08-10 VITALS
HEIGHT: 59 IN | SYSTOLIC BLOOD PRESSURE: 142 MMHG | DIASTOLIC BLOOD PRESSURE: 75 MMHG | WEIGHT: 120 LBS | BODY MASS INDEX: 24.19 KG/M2 | OXYGEN SATURATION: 97 % | HEART RATE: 52 BPM

## 2022-08-10 DIAGNOSIS — G47.30 HYPERSOMNIA WITH SLEEP APNEA: Primary | ICD-10-CM

## 2022-08-10 DIAGNOSIS — G47.10 HYPERSOMNIA WITH SLEEP APNEA: Primary | ICD-10-CM

## 2022-08-10 DIAGNOSIS — G47.20 SLEEP-WAKE 24 HOUR CYCLE DISRUPTION: ICD-10-CM

## 2022-08-10 PROCEDURE — G0463 HOSPITAL OUTPT CLINIC VISIT: HCPCS

## 2022-08-10 PROCEDURE — 99204 OFFICE O/P NEW MOD 45 MIN: CPT | Performed by: INTERNAL MEDICINE

## 2022-08-10 NOTE — PROGRESS NOTES
[facility] New Patient/Consultation       Reason for Consultation: KRISTIN    Patient Care Team:  Raymond Saldaña MD as PCP - General (Internal Medicine)  Albert Hernandez MD as Consulting Physician (Sleep Medicine)    Chief complaint: Frequent awakenings with nocturia    History of present illness:    Thank you for asking me to see your patient.  The patient is a 61 y.o. female who reports waking up every 2-3 hours during the nighttime to go to the bathroom.  Sometimes it would take her 1 to 2 hours to go back to sleep.  She does have 4 or 5 tall glasses of water every day.  She finishes her last one around 1 or 2 AM upon awakening.  The patient goes to bed between 10 and 11 PM and awakens at 7 AM.  She states it takes her 1 hour to fall asleep.  Sometimes she awakens with a headache.  She takes a nap daily.  [place] Sleepiness Scale is abnormal at 8.  She works 9:30 AM until 5 PM 3 days a week.  She is off Tuesday and .  On Saturday she will take a nap between 2 and 3 PM until 5 or 6 PM.  She used to snore some but not really.  She sweats sometimes at nighttime.  Sometimes her sleep is restless.    Review of Systems:    A complete review of systems was done and all were negative with the exception of frequent urination    History:  Past Medical History:   Diagnosis Date   • Family history of colon cancer    • Hyperlipidemia    • Hypertension    ,   Past Surgical History:   Procedure Laterality Date   •  SECTION     • COLONOSCOPY     • COLONOSCOPY N/A 2021    Procedure: COLONOSCOPY to cecum and TI with cold biopsy polypectomies;  Surgeon: Mustapha Valadez MD;  Location: [facility] ENDOSCOPY;  Service: Gastroenterology;  Laterality: N/A;  pre: family h/o colon cancer and personal h/o colon polyps, positive cologuard  post: polyps, hemorrhoids   ,   Family History   Problem Relation Age of Onset   • Heart attack Father    • Hypertension Mother    • Heart disease Sister    •  "Diabetes Sister    • Malig Hyperthermia Neg Hx     and   Social History     Socioeconomic History   • Marital status:    Tobacco Use   • Smoking status: Current Every Day Smoker     Packs/day: 0.25     Years: 20.00     Pack years: 5.00     Types: Cigarettes   • Smokeless tobacco: Never Used   Vaping Use   • Vaping Use: Never used   Substance and Sexual Activity   • Alcohol use: Yes     Comment: Shots/Wine; 1 or 2/day and 6/week   • Drug use: No   • Sexual activity: Yes     Partners: Male     E-cigarette/Vaping   • E-cigarette/Vaping Use Never User      E-cigarette/Vaping Substances     E-cigarette/Vaping Devices        Social History: No caffeine.  , retired    Allergies:  Patient has no known allergies.     Medication Review: Her list was reviewed    Vital Signs:    Vitals:    08/10/22 1100   BP: 142/75   Pulse: 52   SpO2: 97%   Weight: 54.4 kg (120 lb)   Height: 149.9 cm (59\")      Body mass index is 24.24 kg/m².         Physical Exam:    Constitutional:  Well developed 61 y.o. female that appears in no apparent distress.  Awake & oriented times 3.  Normal mood with normal recent and remote memory and normal judgement.  Eyes:  Conjunctivae normal.  Oropharynx: Moist mucous membranes without exudate and a large tongue and class III Mallampati airway, the patient does have an overbite.  Patient wearing a facemask.  Neck: Trachea midline  Respiratory: Effort is not labored  Cardiovascular: Radial pulse regular  Musculoskeletal: Gait appears normal, no digital clubbing evident, no pre-tibial edema    Results Review: Her 3-day sleep log reviewed    Impression:   The patient has complaints of hypersomnolence with frequent awakenings and requires napping.  Some snoring reported.  Based on STOP-BANG questionnaire, the patient has an intermediate pretest probability of having obstructive sleep apnea.    Plan:  Good sleep hygiene measures should be maintained.      After evaluating the patient and " assessing results available, the patient is benefiting from the treatment being provided.     Pathophysiology of KRISTIN described to the patient.  Cardiovascular complications of untreated KRISTIN also reviewed.  I also described that untreated obstructive sleep apnea can contribute to nocturia.  However, she also has significant water intake late into the evening.    After reviewing all with the patient, I would recommend and she is agreeable to proceed with a home sleep study.  I answered all of her questions.  Home sleep study will be scheduled.    Thank you for requesting me to assist in this patient's care.    Albert Hernandez MD  Sleep Medicine  08/21/22  09:14 EDT

## 2022-08-21 PROBLEM — G47.30 HYPERSOMNIA WITH SLEEP APNEA: Status: ACTIVE | Noted: 2022-08-21

## 2022-08-21 PROBLEM — G47.10 HYPERSOMNIA WITH SLEEP APNEA: Status: ACTIVE | Noted: 2022-08-21

## 2022-09-02 RX ORDER — PANTOPRAZOLE SODIUM 20 MG/1
TABLET, DELAYED RELEASE ORAL
Qty: 30 TABLET | Refills: 5 | Status: SHIPPED | OUTPATIENT
Start: 2022-09-02

## 2022-09-02 NOTE — TELEPHONE ENCOUNTER
Rx Refill Note  Requested Prescriptions     Pending Prescriptions Disp Refills   • pantoprazole (PROTONIX) 20 MG EC tablet [Pharmacy Med Name: PANTOPRAZOLE SOD DR 20 MG TAB] 30 tablet 5     Sig: TAKE ONE TABLET BY MOUTH DAILY      Last office visit with prescribing clinician: 8/2/2022      Next office visit with prescribing clinician: 10/6/2022            Augusto Bronson MA  09/02/22, 07:51 EDT

## 2022-09-15 ENCOUNTER — TELEPHONE (OUTPATIENT)
Dept: SLEEP MEDICINE | Facility: HOSPITAL | Age: 62
End: 2022-09-15

## 2022-09-20 ENCOUNTER — HOSPITAL ENCOUNTER (OUTPATIENT)
Dept: SLEEP MEDICINE | Facility: HOSPITAL | Age: 62
Discharge: HOME | End: 2022-09-20
Admitting: INTERNAL MEDICINE

## 2022-09-20 PROCEDURE — 95806 SLEEP STUDY UNATT&RESP EFFT: CPT | Performed by: INTERNAL MEDICINE

## 2022-09-20 PROCEDURE — 95806 SLEEP STUDY UNATT&RESP EFFT: CPT

## 2022-10-06 ENCOUNTER — OFFICE VISIT (OUTPATIENT)
Dept: FAMILY MEDICINE CLINIC | Facility: CLINIC | Age: 62
End: 2022-10-06

## 2022-10-06 ENCOUNTER — TELEPHONE (OUTPATIENT)
Dept: SLEEP MEDICINE | Facility: HOSPITAL | Age: 62
End: 2022-10-06

## 2022-10-06 VITALS
HEIGHT: 59 IN | SYSTOLIC BLOOD PRESSURE: 130 MMHG | WEIGHT: 119.8 LBS | RESPIRATION RATE: 16 BRPM | DIASTOLIC BLOOD PRESSURE: 70 MMHG | BODY MASS INDEX: 24.15 KG/M2

## 2022-10-06 DIAGNOSIS — Z71.6 ENCOUNTER FOR SMOKING CESSATION COUNSELING: ICD-10-CM

## 2022-10-06 DIAGNOSIS — I10 ESSENTIAL HYPERTENSION: Primary | ICD-10-CM

## 2022-10-06 DIAGNOSIS — Z23 NEED FOR INFLUENZA VACCINATION: ICD-10-CM

## 2022-10-06 PROCEDURE — 99213 OFFICE O/P EST LOW 20 MIN: CPT | Performed by: INTERNAL MEDICINE

## 2022-10-06 PROCEDURE — 90471 IMMUNIZATION ADMIN: CPT | Performed by: INTERNAL MEDICINE

## 2022-10-06 PROCEDURE — 90686 IIV4 VACC NO PRSV 0.5 ML IM: CPT | Performed by: INTERNAL MEDICINE

## 2022-10-06 RX ORDER — LISINOPRIL 40 MG/1
TABLET ORAL
Qty: 30 TABLET | Refills: 2 | Status: SHIPPED | OUTPATIENT
Start: 2022-10-06 | End: 2023-01-10

## 2022-10-13 NOTE — PROGRESS NOTES
10/06/2022    CC: Hypertension (F/U---slight headaches since increase in medication---no other issues)  .        HPI  Hypertension  This is a chronic problem. The current episode started more than 1 year ago. The problem has been gradually improving since onset. The problem is controlled. There are no associated agents to hypertension. Past treatments include nothing. Current antihypertension treatment includes diuretics and ACE inhibitors. The current treatment provides moderate improvement.        Anam Nieves is a 61 y.o. female.      The following portions of the patient's history were reviewed and updated as appropriate: allergies, current medications, past family history, past medical history, past social history, past surgical history and problem list.    Problem List  Patient Active Problem List   Diagnosis   • Colon cancer screening   • Acute bronchitis with chronic obstructive pulmonary disease (COPD) (HCC)   • Family history of colon cancer in mother   • Hyperlipidemia   • Hypertension   • Hypokalemia   • Iliotibial band syndrome of right side   • Low back pain radiating to right leg   • Tobacco abuse   • Trochanteric bursitis of right hip   • Polyp of colon   • Family history of polyps in the colon   • Hypersomnia with sleep apnea       Past Medical History  Past Medical History:   Diagnosis Date   • Family history of colon cancer    • Hyperlipidemia    • Hypertension        Surgical History  Past Surgical History:   Procedure Laterality Date   •  SECTION     • COLONOSCOPY     • COLONOSCOPY N/A 2021    Procedure: COLONOSCOPY to cecum and TI with cold biopsy polypectomies;  Surgeon: Mustapha Valadez MD;  Location: [facility] ENDOSCOPY;  Service: Gastroenterology;  Laterality: N/A;  pre: family h/o colon cancer and personal h/o colon polyps, positive cologuard  post: polyps, hemorrhoids       Family History  Family History   Problem Relation Age of Onset   • Heart attack  Father    • Hypertension Mother    • Heart disease Sister    • Diabetes Sister    • Malig Hyperthermia Neg Hx        Social History  Social History    Tobacco Use      Smoking status: Every Day        Packs/day: 0.25        Years: 20.00        Pack years: 5        Types: Cigarettes      Smokeless tobacco: Never       Is the Patient a current tobacco user? No    Allergies  No Known Allergies    Current Medications    Current Outpatient Medications:   •  albuterol sulfate  (90 Base) MCG/ACT inhaler, Inhale 2 puffs 4 (Four) Times a Day., Disp: 18 g, Rfl: 1  •  hydroCHLOROthiazide (HYDRODIURIL) 25 MG tablet, Take 1 tablet by mouth Daily., Disp: 30 tablet, Rfl: 5  •  ibuprofen (ADVIL,MOTRIN) 600 MG tablet, Take 600 mg by mouth Daily As Needed., Disp: , Rfl:   •  lisinopril (PRINIVIL,ZESTRIL) 40 MG tablet, TAKE ONE TABLET BY MOUTH DAILY, Disp: 30 tablet, Rfl: 2  •  MULTIPLE VITAMINS-MINERALS ER PO, Take 1 tablet by mouth Daily., Disp: , Rfl:   •  pantoprazole (PROTONIX) 20 MG EC tablet, TAKE ONE TABLET BY MOUTH DAILY, Disp: 30 tablet, Rfl: 5  •  vitamin E 100 UNIT capsule, Take 100 Units by mouth Daily., Disp: , Rfl:      Review of System  Review of Systems   HENT: Negative.    Eyes: Negative.    Respiratory: Negative.    Cardiovascular: Negative.      I have reviewed and confirmed the accuracy of the ROS as documented by the MA/LPN/RN Raymond Saldaña MD    Vitals:    10/06/22 1538   BP: 130/70   Resp: 16     Body mass index is 24.2 kg/m².    Objective     Physical Exam  Physical Exam  HENT:      Head: Normocephalic and atraumatic.      Nose: Nose normal.   Cardiovascular:      Rate and Rhythm: Normal rate and regular rhythm.      Pulses: Normal pulses.      Heart sounds: Normal heart sounds.   Pulmonary:      Effort: Pulmonary effort is normal.         Assessment & Plan      This 61-year-old patient presents at this time for follow-up of hypertension.  She relates she is feeling fine having had no problems in  the interim of visits.  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  She is still exploring teaching at several universities.    Patient's blood pressure is much improved from the past With a blood pressure today is 130/70 compared to 142/75 approximately month or so ago.  She relates that she has had an occasional headache in the frontal area but relates that her blood pressure is much improved.    An ongoing problem has been decreasing her cigarette consumption which she had been steadily doing until this visit.  She relates that she regressed some what going back to about 4 cigarettes a day instead of 2-3.  We have encouraged her to continue at this for a day.  And then try to ratchet down again in the next several weeks.  Diagnoses and all orders for this visit:    1. Essential hypertension (Primary)    2. Need for influenza vaccination  -     FluLaval/Fluarix/Fluzone >6 Months    3. Encounter for smoking cessation counseling      Plan:  1.)  Follow-up for smoking cessation in about 4 months.       Raymond Saldaña MD  10/06/2022

## 2022-10-20 ENCOUNTER — OFFICE VISIT (OUTPATIENT)
Dept: SLEEP MEDICINE | Facility: HOSPITAL | Age: 62
End: 2022-10-20

## 2022-10-20 VITALS — HEIGHT: 59 IN | OXYGEN SATURATION: 96 % | HEART RATE: 90 BPM | WEIGHT: 121 LBS | BODY MASS INDEX: 24.39 KG/M2

## 2022-10-20 DIAGNOSIS — G47.30 SLEEP-DISORDERED BREATHING: Primary | ICD-10-CM

## 2022-10-20 PROCEDURE — 99213 OFFICE O/P EST LOW 20 MIN: CPT | Performed by: INTERNAL MEDICINE

## 2022-10-20 PROCEDURE — G0463 HOSPITAL OUTPT CLINIC VISIT: HCPCS

## 2022-10-20 NOTE — PROGRESS NOTES
"Follow Up [facility] Disorders [facility] Note     Chief Complaint:  KRISTIN     Primary Care Physician: Raymond Saldaña MD    Interval History:   The patient is a 61 y.o. female  who I last saw 8/10/2022 and that note was reviewed.  Due to complaints of frequent awakenings with nocturia, the patient did undergo home sleep study 9/20/2022.  No KRISTIN identified with AHI at 3.9 events per hour.  When supine, still normal at 4.7 events per hour.  No sleep-related hypoxia.  The patient did snore 14% of total monitoring time.    The patient states she is better.  She goes to bed at 10 PM and awakens at 8 AM.  She will use the restroom during that time.    Review of Systems:    A complete review of systems was done and all were negative with the exception of none    Social History:    Social History     Socioeconomic History   • Marital status:    Tobacco Use   • Smoking status: Every Day     Packs/day: 0.25     Years: 20.00     Pack years: 5.00     Types: Cigarettes   • Smokeless tobacco: Never   Vaping Use   • Vaping Use: Never used   Substance and Sexual Activity   • Alcohol use: Yes     Comment: Shots/Wine; 1 or 2/day and 6/week   • Drug use: No   • Sexual activity: Yes     Partners: Male       Allergies:  Patient has no known allergies.     Medication Review:  Reviewed.      Vital Signs:    Vitals:    10/20/22 1026   Pulse: 90   SpO2: 96%   Weight: 54.9 kg (121 lb)   Height: 149.9 cm (59\")     Body mass index is 24.44 kg/m².    Physical Exam:    Constitutional:  Well developed 61 y.o. female that appears in no apparent distress.  Awake & oriented times 3.  Normal mood with normal recent and remote memory and normal judgement.  Eyes:  Conjunctivae normal.  Oropharynx: Previously, moist mucous membranes without exudate and a large tongue and class III Mallampati airway, the patient does have an overbite, patient is wearing a facemask.    Self-administered [place] Sleepiness Scale test results: 2  0-5 Lower normal daytime " sleepiness  6-10 Higher normal daytime sleepiness  11-12 Mild, 13-15 Moderate, & 16-24 Severe excessive daytime sleepiness    Impression:   Sleep disordered breathing with home sleep study 9/20/2022 demonstrated no obstructive sleep apnea and no sleep-related hypoxia.  The patient did snore 14% of total monitoring time.  Presently, the patient has no complaints of hypersomnolence.      Plan:  Good sleep hygiene measures should be maintained.      As stated, no obstructive sleep apnea and no sleep-related hypoxia identified by home sleep study 9/20/2022.  Presently, the patient states she is better.  If the patient does have another spell of frequent awakenings and poor sleep, could consider low-dose amitriptyline or doxepin?    I answered all of the patient's questions.  The patient will call for any problems and will follow up as needed       Albert Hernandez MD  Sleep Medicine  10/20/22  10:40 EDT

## 2022-10-23 PROBLEM — G47.30 SLEEP-DISORDERED BREATHING: Status: ACTIVE | Noted: 2022-09-20

## 2022-11-01 ENCOUNTER — APPOINTMENT (OUTPATIENT)
Dept: WOMENS IMAGING | Facility: HOSPITAL | Age: 62
End: 2022-11-01

## 2022-11-01 ENCOUNTER — OFFICE VISIT (OUTPATIENT)
Dept: OBSTETRICS AND GYNECOLOGY | Facility: CLINIC | Age: 62
End: 2022-11-01

## 2022-11-01 ENCOUNTER — PROCEDURE VISIT (OUTPATIENT)
Dept: OBSTETRICS AND GYNECOLOGY | Facility: CLINIC | Age: 62
End: 2022-11-01

## 2022-11-01 VITALS
DIASTOLIC BLOOD PRESSURE: 70 MMHG | BODY MASS INDEX: 24.39 KG/M2 | WEIGHT: 121 LBS | HEIGHT: 59 IN | SYSTOLIC BLOOD PRESSURE: 120 MMHG

## 2022-11-01 DIAGNOSIS — Z12.31 VISIT FOR SCREENING MAMMOGRAM: Primary | ICD-10-CM

## 2022-11-01 DIAGNOSIS — Z00.00 ANNUAL PHYSICAL EXAM: Primary | ICD-10-CM

## 2022-11-01 PROCEDURE — 77067 SCR MAMMO BI INCL CAD: CPT | Performed by: OBSTETRICS & GYNECOLOGY

## 2022-11-01 PROCEDURE — 77063 BREAST TOMOSYNTHESIS BI: CPT | Performed by: OBSTETRICS & GYNECOLOGY

## 2022-11-01 PROCEDURE — 77063 BREAST TOMOSYNTHESIS BI: CPT | Performed by: RADIOLOGY

## 2022-11-01 PROCEDURE — 77067 SCR MAMMO BI INCL CAD: CPT | Performed by: RADIOLOGY

## 2022-11-01 PROCEDURE — 99396 PREV VISIT EST AGE 40-64: CPT | Performed by: OBSTETRICS & GYNECOLOGY

## 2022-11-01 NOTE — PROGRESS NOTES
HALEY Nieves  is a 61 y.o. female who presents for a routine gynecologic exam.  Overall, she is feeling well.  Bowels and bladder are functioning normally.  The patient does not have hot flashes this year.  Mammogram was performed today.  The patient is current with colon cancer screening.    Chief Complaint   Patient presents with   • Gynecologic Exam     Patient is here for a annual.       Past Medical History:   Diagnosis Date   • Family history of colon cancer    • Hyperlipidemia    • Hypertension    • Sleep-disordered breathing 2022    Home sleep study.  Weight 120 pounds.  AHI normal at 3.9 events per hour.  No sleep-related hypoxia.  The patient snored 14% of total monitoring time.       Past Surgical History:   Procedure Laterality Date   •  SECTION     • COLONOSCOPY     • COLONOSCOPY N/A 2021    Procedure: COLONOSCOPY to cecum and TI with cold biopsy polypectomies;  Surgeon: Mustapha Valadez MD;  Location: [facility] ENDOSCOPY;  Service: Gastroenterology;  Laterality: N/A;  pre: family h/o colon cancer and personal h/o colon polyps, positive cologuard  post: polyps, hemorrhoids       Social History     Socioeconomic History   • Marital status:    Tobacco Use   • Smoking status: Every Day     Packs/day: 0.25     Years: 20.00     Pack years: 5.00     Types: Cigarettes   • Smokeless tobacco: Never   Vaping Use   • Vaping Use: Never used   Substance and Sexual Activity   • Alcohol use: Yes     Comment: Shots/Wine; 1 or 2/day and 6/week   • Drug use: No   • Sexual activity: Yes     Partners: Male       The following portions of the patient's history were reviewed and updated as appropriate: allergies, current medications, past family history, past medical history, past social history, past surgical history and problem list.    Review of Systems   Constitutional: Negative.    HENT: Negative.    Respiratory: Negative.    Cardiovascular: Negative.    Gastrointestinal: Negative.     Genitourinary: Negative.    Musculoskeletal: Negative.    Skin: Negative.    Allergic/Immunologic: Negative.    Psychiatric/Behavioral: Negative.      Patient reports that she is not currently experiencing any symptoms of urinary incontinence.          Physical Exam  Vitals and nursing note reviewed.   Constitutional:       Appearance: She is well-developed.   HENT:      Head: Normocephalic and atraumatic.   Cardiovascular:      Rate and Rhythm: Normal rate and regular rhythm.   Pulmonary:      Effort: Pulmonary effort is normal.      Breath sounds: Normal breath sounds. No wheezing or rales.   Chest:      Comments: The breasts are homogeneous.  There are no palpable lumps.  Nipple discharge and axillary adenopathy are absent.  Abdominal:      General: There is no distension.      Palpations: Abdomen is soft.      Tenderness: There is no abdominal tenderness.   Genitourinary:     Labia:         Right: No lesion.         Left: No lesion.       Vagina: Normal. No vaginal discharge.      Cervix: No cervical motion tenderness.      Adnexa:         Right: No mass or tenderness.          Left: No mass or tenderness.        Comments: The uterus is very small and mobile within the pelvis.  It is not tender to palpation  Skin:     General: Skin is warm and dry.   Neurological:      Mental Status: She is alert and oriented to person, place, and time.         Assessment    Diagnoses and all orders for this visit:    1. Annual physical exam (Primary)        Plan  1. Annual examination performed  2. Counseled regarding the importance of regular screening mammograms.  Mammogram was performed today.  3. The patient is current with colon cancer screening    4. Return in about 1 year (around 11/1/2023).    Social History     Tobacco Use   Smoking Status Every Day   • Packs/day: 0.25   • Years: 20.00   • Pack years: 5.00   • Types: Cigarettes   Smokeless Tobacco Never   5.

## 2022-11-17 DIAGNOSIS — I10 ESSENTIAL HYPERTENSION: ICD-10-CM

## 2022-11-17 RX ORDER — HYDROCHLOROTHIAZIDE 25 MG/1
TABLET ORAL
Qty: 30 TABLET | Refills: 5 | Status: SHIPPED | OUTPATIENT
Start: 2022-11-17

## 2022-12-15 ENCOUNTER — OFFICE VISIT (OUTPATIENT)
Dept: FAMILY MEDICINE CLINIC | Facility: CLINIC | Age: 62
End: 2022-12-15

## 2022-12-15 VITALS
HEIGHT: 59 IN | RESPIRATION RATE: 16 BRPM | SYSTOLIC BLOOD PRESSURE: 100 MMHG | WEIGHT: 119 LBS | BODY MASS INDEX: 23.99 KG/M2 | DIASTOLIC BLOOD PRESSURE: 52 MMHG

## 2022-12-15 DIAGNOSIS — Z13.0 SCREENING FOR DEFICIENCY ANEMIA: ICD-10-CM

## 2022-12-15 DIAGNOSIS — I10 ESSENTIAL HYPERTENSION: Primary | ICD-10-CM

## 2022-12-15 DIAGNOSIS — R35.1 NOCTURIA: ICD-10-CM

## 2022-12-15 DIAGNOSIS — E78.01 FAMILIAL HYPERCHOLESTEROLEMIA: ICD-10-CM

## 2022-12-15 PROCEDURE — 99396 PREV VISIT EST AGE 40-64: CPT | Performed by: INTERNAL MEDICINE

## 2022-12-15 PROCEDURE — 3008F BODY MASS INDEX DOCD: CPT | Performed by: INTERNAL MEDICINE

## 2022-12-15 PROCEDURE — 2014F MENTAL STATUS ASSESS: CPT | Performed by: INTERNAL MEDICINE

## 2022-12-15 NOTE — PROGRESS NOTES
12/15/2022    CC: Annual Exam (No other issues)  .        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  She relates she is feeling well.       Subjective   Raquel Nieves is a 62 y.o. female.      The following portions of the patient's history were reviewed and updated as appropriate: allergies, current medications, past family history, past medical history, past social history, past surgical history and problem list.    Problem List  Patient Active Problem List   Diagnosis   • Colon cancer screening   • Acute bronchitis with chronic obstructive pulmonary disease (COPD) (HCC)   • Family history of colon cancer in mother   • Hyperlipidemia   • Hypertension   • Hypokalemia   • Iliotibial band syndrome of right side   • Low back pain radiating to right leg   • Tobacco abuse   • Trochanteric bursitis of right hip   • Polyp of colon   • Family history of polyps in the colon   • Sleep-disordered breathing       Past Medical History  Past Medical History:   Diagnosis Date   • Family history of colon cancer    • Hyperlipidemia    • Hypertension    • Sleep-disordered breathing 2022    Home sleep study.  Weight 120 pounds.  AHI normal at 3.9 events per hour.  No sleep-related hypoxia.  The patient snored 14% of total monitoring time.       Surgical History  Past Surgical History:   Procedure Laterality Date   •  SECTION     • COLONOSCOPY     • COLONOSCOPY N/A 2021    Procedure: COLONOSCOPY to cecum and TI with cold biopsy polypectomies;  Surgeon: Mustapha Valadez MD;  Location: [facility] ENDOSCOPY;  Service: Gastroenterology;  Laterality: N/A;  pre: family h/o colon cancer and personal h/o colon polyps, positive cologuard  post: polyps, hemorrhoids       Family History  Family History   Problem Relation Age of Onset   • Heart attack Father    • Hypertension Mother    • Heart disease Sister    •  Diabetes Sister    • Malig Hyperthermia Neg Hx        Social History  Social History    Tobacco Use      Smoking status: Every Day        Packs/day: 0.25        Years: 20.00        Pack years: 5        Types: Cigarettes      Smokeless tobacco: Never       Is the Patient a current tobacco user? No    Allergies  No Known Allergies    Current Medications    Current Outpatient Medications:   •  albuterol sulfate  (90 Base) MCG/ACT inhaler, Inhale 2 puffs 4 (Four) Times a Day., Disp: 18 g, Rfl: 1  •  hydroCHLOROthiazide (HYDRODIURIL) 25 MG tablet, TAKE ONE TABLET BY MOUTH DAILY, Disp: 30 tablet, Rfl: 5  •  ibuprofen (ADVIL,MOTRIN) 600 MG tablet, Take 600 mg by mouth Daily As Needed., Disp: , Rfl:   •  lisinopril (PRINIVIL,ZESTRIL) 40 MG tablet, TAKE ONE TABLET BY MOUTH DAILY, Disp: 30 tablet, Rfl: 2  •  MULTIPLE VITAMINS-MINERALS ER PO, Take 1 tablet by mouth Daily., Disp: , Rfl:   •  pantoprazole (PROTONIX) 20 MG EC tablet, TAKE ONE TABLET BY MOUTH DAILY, Disp: 30 tablet, Rfl: 5  •  vitamin E 100 UNIT capsule, Take 100 Units by mouth Daily., Disp: , Rfl:      Review of System  Review of Systems   Constitutional: Negative.    HENT: Negative.    Eyes: Negative.    Respiratory: Negative.    Cardiovascular: Negative.    Gastrointestinal: Negative.    Endocrine: Negative.    Genitourinary: Negative.    Musculoskeletal: Negative.    Skin: Negative.    Allergic/Immunologic: Negative.    Neurological: Negative.    Hematological: Negative.    Psychiatric/Behavioral: Negative.      I have reviewed and confirmed the accuracy of the ROS as documented by the MA/LPN/RN Raymond Saldaña MD    Vitals:    12/15/22 1430   BP: 100/52   Resp: 16     Body mass index is 24.04 kg/m².    Objective     Physical Exam  Physical Exam  Vitals and nursing note reviewed.   Constitutional:       Appearance: She is well-developed.   HENT:      Head: Normocephalic and atraumatic.   Eyes:      Conjunctiva/sclera: Conjunctivae normal.    Cardiovascular:      Rate and Rhythm: Normal rate and regular rhythm.      Heart sounds: Normal heart sounds.   Pulmonary:      Effort: Pulmonary effort is normal.      Breath sounds: Normal breath sounds.   Abdominal:      General: Bowel sounds are normal.      Palpations: Abdomen is soft.   Musculoskeletal:         General: Normal range of motion.      Cervical back: Normal range of motion and neck supple.   Skin:     General: Skin is warm and dry.   Neurological:      Mental Status: She is alert and oriented to person, place, and time.   Psychiatric:         Behavior: Behavior normal.         Assessment & Plan       This 62-year-old patient presents today for physical examination.  She relates she is feeling fine having had no problems in the interim of visits.  Unfortunately her smoking cessation program has faltered some she relates she was under a lot of stress and returned to smoking about 30    Cigarettes a day.      Regarding anticipatory guidance.  We discussed the importance of staying active.  Goal of exercising 30 minutes 5 days a week was set.  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    She has given up her smoking cessation program for now.  She relates that she was just under some increasing stress several weeks or so ago and found her self returning back to smoking one third of a pack per day.  She like to pick this up again as she realizes the importance of smoking cessation.    Regarding anticipatory guidance.  We discussed the importance of a low-cholesterol diet.  We gave her a low-cholesterol diet sheet and gave her the opportunity for questions regarding it.  Our goal is to keep her LDL less than 100.  Patient is not fasting at this time she will return for a fasting lipid panel in the next few weeks.  Diagnoses and all orders for this visit:    1. Essential hypertension (Primary)  -     Comprehensive Metabolic Panel    2.  Screening for deficiency anemia  -     CBC & Differential    3. Familial hypercholesterolemia  -     Lipid Panel With / Chol / HDL Ratio    4. Nocturia  -     Urinalysis With Culture If Indicated -         Plan:  1.)  Follow-up in 4 to 5 months.  2.)  Comprehensive metabolic profile, CBC, lipid profile  Plan:  1.)  Follow-up in the next several months.  Raymond Saldaña MD  12/15/2022

## 2022-12-24 LAB
ALBUMIN SERPL-MCNC: 4.2 G/DL (ref 3.5–5.2)
ALBUMIN/GLOB SERPL: 1.6 G/DL
ALP SERPL-CCNC: 98 U/L (ref 39–117)
ALT SERPL-CCNC: 9 U/L (ref 1–33)
APPEARANCE UR: CLEAR
AST SERPL-CCNC: 15 U/L (ref 1–32)
BACTERIA #/AREA URNS HPF: ABNORMAL /HPF
BACTERIA UR CULT: ABNORMAL
BASOPHILS # BLD AUTO: 0.05 10*3/MM3 (ref 0–0.2)
BASOPHILS NFR BLD AUTO: 0.5 % (ref 0–1.5)
BILIRUB SERPL-MCNC: 0.3 MG/DL (ref 0–1.2)
BILIRUB UR QL STRIP: NEGATIVE
BUN SERPL-MCNC: 25 MG/DL (ref 8–23)
BUN/CREAT SERPL: 20.7 (ref 7–25)
CALCIUM SERPL-MCNC: 9.3 MG/DL (ref 8.6–10.5)
CASTS URNS QL MICRO: ABNORMAL /LPF
CHLORIDE SERPL-SCNC: 102 MMOL/L (ref 98–107)
CHOLEST SERPL-MCNC: 248 MG/DL (ref 0–200)
CHOLEST/HDLC SERPL: 4.59 {RATIO}
CO2 SERPL-SCNC: 27.6 MMOL/L (ref 22–29)
COLOR UR: YELLOW
CREAT SERPL-MCNC: 1.21 MG/DL (ref 0.57–1)
EGFRCR SERPLBLD CKD-EPI 2021: 50.2 ML/MIN/1.73
EOSINOPHIL # BLD AUTO: 0.31 10*3/MM3 (ref 0–0.4)
EOSINOPHIL NFR BLD AUTO: 2.9 % (ref 0.3–6.2)
EPI CELLS #/AREA URNS HPF: ABNORMAL /HPF (ref 0–10)
ERYTHROCYTE [DISTWIDTH] IN BLOOD BY AUTOMATED COUNT: 13.2 % (ref 12.3–15.4)
GLOBULIN SER CALC-MCNC: 2.6 GM/DL
GLUCOSE SERPL-MCNC: 115 MG/DL (ref 65–99)
GLUCOSE UR QL STRIP: NEGATIVE
HCT VFR BLD AUTO: 36.2 % (ref 34–46.6)
HDLC SERPL-MCNC: 54 MG/DL (ref 40–60)
HGB BLD-MCNC: 12.1 G/DL (ref 12–15.9)
HGB UR QL STRIP: NEGATIVE
IMM GRANULOCYTES # BLD AUTO: 0.02 10*3/MM3 (ref 0–0.05)
IMM GRANULOCYTES NFR BLD AUTO: 0.2 % (ref 0–0.5)
KETONES UR QL STRIP: NEGATIVE
LDLC SERPL CALC-MCNC: 180 MG/DL (ref 0–100)
LEUKOCYTE ESTERASE UR QL STRIP: ABNORMAL
LYMPHOCYTES # BLD AUTO: 3.47 10*3/MM3 (ref 0.7–3.1)
LYMPHOCYTES NFR BLD AUTO: 32.1 % (ref 19.6–45.3)
MCH RBC QN AUTO: 30.6 PG (ref 26.6–33)
MCHC RBC AUTO-ENTMCNC: 33.4 G/DL (ref 31.5–35.7)
MCV RBC AUTO: 91.4 FL (ref 79–97)
MICRO URNS: ABNORMAL
MONOCYTES # BLD AUTO: 0.96 10*3/MM3 (ref 0.1–0.9)
MONOCYTES NFR BLD AUTO: 8.9 % (ref 5–12)
NEUTROPHILS # BLD AUTO: 6.01 10*3/MM3 (ref 1.7–7)
NEUTROPHILS NFR BLD AUTO: 55.4 % (ref 42.7–76)
NITRITE UR QL STRIP: NEGATIVE
NRBC BLD AUTO-RTO: 0 /100 WBC (ref 0–0.2)
OTHER ANTIBIOTIC SUSC ISLT: ABNORMAL
PH UR STRIP: 5.5 [PH] (ref 5–7.5)
PLATELET # BLD AUTO: 299 10*3/MM3 (ref 140–450)
POTASSIUM SERPL-SCNC: 4.1 MMOL/L (ref 3.5–5.2)
PROT SERPL-MCNC: 6.8 G/DL (ref 6–8.5)
PROT UR QL STRIP: NEGATIVE
RBC # BLD AUTO: 3.96 10*6/MM3 (ref 3.77–5.28)
RBC #/AREA URNS HPF: ABNORMAL /HPF (ref 0–2)
SODIUM SERPL-SCNC: 140 MMOL/L (ref 136–145)
SP GR UR STRIP: 1.01 (ref 1–1.03)
TRIGL SERPL-MCNC: 82 MG/DL (ref 0–150)
URINALYSIS REFLEX: ABNORMAL
UROBILINOGEN UR STRIP-MCNC: 0.2 MG/DL (ref 0.2–1)
VLDLC SERPL CALC-MCNC: 14 MG/DL (ref 5–40)
WBC # BLD AUTO: 10.82 10*3/MM3 (ref 3.4–10.8)
WBC #/AREA URNS HPF: ABNORMAL /HPF (ref 0–5)

## 2022-12-27 DIAGNOSIS — R73.09 ELEVATED GLUCOSE: Primary | ICD-10-CM

## 2022-12-27 DIAGNOSIS — N39.0 URINARY TRACT INFECTION WITHOUT HEMATURIA, SITE UNSPECIFIED: ICD-10-CM

## 2022-12-27 RX ORDER — DOXYCYCLINE HYCLATE 50 MG/1
50 CAPSULE ORAL 2 TIMES DAILY
Qty: 14 CAPSULE | Refills: 0 | Status: SHIPPED | OUTPATIENT
Start: 2022-12-27 | End: 2022-12-27

## 2022-12-27 RX ORDER — ROSUVASTATIN CALCIUM 10 MG/1
TABLET, COATED ORAL
Qty: 90 TABLET | Refills: 1 | Status: SHIPPED | OUTPATIENT
Start: 2022-12-27

## 2022-12-27 RX ORDER — NITROFURANTOIN 25; 75 MG/1; MG/1
100 CAPSULE ORAL 2 TIMES DAILY
Qty: 14 CAPSULE | Refills: 0 | Status: SHIPPED | OUTPATIENT
Start: 2022-12-27

## 2023-01-10 ENCOUNTER — TELEPHONE (OUTPATIENT)
Dept: FAMILY MEDICINE CLINIC | Facility: CLINIC | Age: 63
End: 2023-01-10
Payer: MEDICAID

## 2023-01-10 RX ORDER — LISINOPRIL 40 MG/1
TABLET ORAL
Qty: 30 TABLET | Refills: 2 | Status: SHIPPED | OUTPATIENT
Start: 2023-01-10

## 2023-01-10 NOTE — TELEPHONE ENCOUNTER
Hub please inform patient    Patient's hemoglobin A1c is elevated at 7.1.  I attempted to discuss with the patient her laboratory results however I received a voicemail and so I have asked the patient to give me a call so we can review what actions we need to take regarding her diabetes mellitus type 2.

## 2023-01-10 NOTE — TELEPHONE ENCOUNTER
----- Message from Raymond Saldaña MD sent at 1/9/2023  9:07 PM EST -----  Patient's hemoglobin A1c is elevated at 7.1.  I attempted to discuss with the patient her laboratory results however I received a voicemail and so I have asked the patient to give me a call so we can review what actions we need to take regarding her diabetes mellitus type 2.  ----- Message -----  From: Brooke Hercules Results In  Sent: 1/6/2023   3:09 AM EST  To: Raymond Saldaña MD

## 2023-02-09 ENCOUNTER — TELEPHONE (OUTPATIENT)
Dept: FAMILY MEDICINE CLINIC | Facility: CLINIC | Age: 63
End: 2023-02-09
Payer: MEDICAID

## 2023-02-09 DIAGNOSIS — R73.09 ELEVATED GLUCOSE: Primary | ICD-10-CM

## 2023-02-09 NOTE — TELEPHONE ENCOUNTER
Reach the patient after missing several times.  We discussed her elevated glucose levels.  She will come in for repeat in the next few days.  We will also check her kidneys with for microalbumin

## 2023-02-20 DIAGNOSIS — E11.65 TYPE 2 DIABETES MELLITUS WITH HYPERGLYCEMIA, WITHOUT LONG-TERM CURRENT USE OF INSULIN: Primary | ICD-10-CM

## 2023-02-20 RX ORDER — LANCETS
EACH MISCELLANEOUS
Qty: 3 EACH | Refills: 0 | Status: SHIPPED | OUTPATIENT
Start: 2023-02-20

## 2023-02-20 RX ORDER — BLOOD-GLUCOSE METER
EACH MISCELLANEOUS
Qty: 1 KIT | Refills: 0 | Status: SHIPPED | OUTPATIENT
Start: 2023-02-20

## 2023-02-27 ENCOUNTER — TELEPHONE (OUTPATIENT)
Dept: FAMILY MEDICINE CLINIC | Facility: CLINIC | Age: 63
End: 2023-02-27

## 2023-02-27 NOTE — TELEPHONE ENCOUNTER
PATIENT IS CALLING TO REPORT HER BLOOD SUGAR.     PATIENT STATES THAT ALL OF THE FOLLOWING READINGS WERE TAKEN IN THE MORNING.     2/24/  2/25/  2/26/  2/27/

## 2023-03-07 ENCOUNTER — TELEPHONE (OUTPATIENT)
Dept: FAMILY MEDICINE CLINIC | Facility: CLINIC | Age: 63
End: 2023-03-07
Payer: MEDICAID

## 2023-03-07 ENCOUNTER — HOSPITAL ENCOUNTER (OUTPATIENT)
Dept: DIABETES SERVICES | Facility: HOSPITAL | Age: 63
Setting detail: RECURRING SERIES
Discharge: HOME | End: 2023-03-07
Payer: MEDICAID

## 2023-03-07 PROCEDURE — G0109 DIAB MANAGE TRN IND/GROUP: HCPCS

## 2023-03-14 ENCOUNTER — HOSPITAL ENCOUNTER (OUTPATIENT)
Dept: DIABETES SERVICES | Facility: HOSPITAL | Age: 63
Setting detail: RECURRING SERIES
Discharge: HOME | End: 2023-03-14
Payer: MEDICAID

## 2023-03-14 PROCEDURE — G0109 DIAB MANAGE TRN IND/GROUP: HCPCS

## 2023-03-21 ENCOUNTER — HOSPITAL ENCOUNTER (OUTPATIENT)
Dept: DIABETES SERVICES | Facility: HOSPITAL | Age: 63
Setting detail: RECURRING SERIES
Discharge: HOME | End: 2023-03-21
Payer: MEDICAID

## 2023-03-21 PROCEDURE — G0109 DIAB MANAGE TRN IND/GROUP: HCPCS

## 2023-03-21 NOTE — PROGRESS NOTES
Mrs. Nieves was seen today by Registered Dietitian for the final Diabetes Education Class. Consistent with the ADA’s standards of care, a comprehensive assessment/training record and patient goals have been sent to medical records (see media tab)    Mrs. Nieves has been encouraged to call our office with questions or additional education needs. Please place referral for additional services or follow-up should need arise.    Thank you for the referral.      Ambika Rodney RD, LD, [id number]

## 2023-03-27 NOTE — TELEPHONE ENCOUNTER
Caller: Chelle Nievese    Relationship: Self    Best call back number: [phone]    Requested Prescriptions:   Requested Prescriptions     Pending Prescriptions Disp Refills   • metFORMIN (GLUCOPHAGE) 500 MG tablet 30 tablet 0     Sig: Take 1 tablet by mouth 2 (Two) Times a Day With Meals.        Pharmacy where request should be sent: [facility] [id number] AT [facility] [address] [facility] - [phone]  - [phone] FX     Last office visit with prescribing clinician: 12/15/2022   Last telemedicine visit with prescribing clinician: 4/13/2023   Next office visit with prescribing clinician: 4/13/2023     Additional details provided by patient: TOOK LAST DOSE YESTERDAY     Does the patient have less than a 3 day supply:  [x] Yes  [] No    Would you like a call back once the refill request has been completed: [] Yes [] No    If the office needs to give you a call back, can they leave a voicemail: [] Yes [] No    Tonny Estrella Rep   03/27/23 11:55 EDT

## 2023-04-10 RX ORDER — LISINOPRIL 40 MG/1
TABLET ORAL
Qty: 30 TABLET | Refills: 2 | Status: SHIPPED | OUTPATIENT
Start: 2023-04-10

## 2023-04-13 ENCOUNTER — OFFICE VISIT (OUTPATIENT)
Dept: FAMILY MEDICINE CLINIC | Facility: CLINIC | Age: 63
End: 2023-04-13
Payer: MEDICAID

## 2023-04-13 VITALS
SYSTOLIC BLOOD PRESSURE: 110 MMHG | HEART RATE: 88 BPM | DIASTOLIC BLOOD PRESSURE: 60 MMHG | HEIGHT: 59 IN | WEIGHT: 117 LBS | OXYGEN SATURATION: 96 % | BODY MASS INDEX: 23.59 KG/M2

## 2023-04-13 DIAGNOSIS — J34.89 RHINORRHEA: ICD-10-CM

## 2023-04-13 DIAGNOSIS — Z72.0 TOBACCO ABUSE: ICD-10-CM

## 2023-04-13 DIAGNOSIS — E11.65 TYPE 2 DIABETES MELLITUS WITH HYPERGLYCEMIA, WITHOUT LONG-TERM CURRENT USE OF INSULIN: Primary | ICD-10-CM

## 2023-04-13 LAB
EXPIRATION DATE: NORMAL
HBA1C MFR BLD: 6.6 %
Lab: NORMAL

## 2023-04-13 RX ORDER — FEXOFENADINE HCL 180 MG/1
180 TABLET ORAL DAILY
Qty: 30 TABLET | Refills: 3 | Status: SHIPPED | OUTPATIENT
Start: 2023-04-13

## 2023-04-13 NOTE — PROGRESS NOTES
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  She relates that the metformin causes her to have continued abdominal discomfort and pain.  She also relates it produces excess flatulence and she has had constipation.  She relates that she goes up to a week at a time without a bowel movement when this was not the case prior to her start on metformin.  Her last hemoglobin A1c before metformin was 7.1.    Patient hemoglobin A1c today is 6.6.  She relates that she is able to take the metformin most days.  Her blood pressure shows good control at 110/60 in the left arm sitting position standard cuff.    She relates she has had rhinorrhea and sinus problems over the past several days.  She admits to being outside more by getting more exercise such as walking etc.    Diagnoses and all orders for this visit:    1. Type 2 diabetes mellitus with hyperglycemia, without long-term current use of insulin (Primary)  -     POCT glycated hemoglobin, total; Standing  -     POCT glycated hemoglobin, total    2. Tobacco abuse    3. Rhinorrhea    Other orders  -     SITagliptin (Januvia) 50 MG tablet; Take 1 tablet by mouth Daily.  Dispense: 30 tablet; Refill: 3      Plan:  1.)  Januvia 50 mg 1 tab p.o. every morning  2.)  DC metformin because of medicinal side effects.  3.)  Follow-up in 3 months  4.)  Allegra 180 mg 1 tab p.o. daily for sinusitis       CC: Diabetes (F/U.  Having issues with Metformin---no other issues)  .        HPI  History of Present Illness  This pleasant patient presents at this time for follow-up of newly diagnosed diabetes mellitus type 2.       Subjective   Raquel Nieves is a 62 y.o. female.      The following portions of the patient's history were reviewed and updated as appropriate: allergies, current medications, past family history, past medical history, past social history, past surgical history and problem list.    Problem List  Patient Active  Problem List   Diagnosis   • Colon cancer screening   • Acute bronchitis with chronic obstructive pulmonary disease (COPD)   • Family history of colon cancer in mother   • Hyperlipidemia   • Hypertension   • Hypokalemia   • Iliotibial band syndrome of right side   • Low back pain radiating to right leg   • Tobacco abuse   • Trochanteric bursitis of right hip   • Polyp of colon   • Family history of polyps in the colon   • Sleep-disordered breathing       Past Medical History  Past Medical History:   Diagnosis Date   • Family history of colon cancer    • Hyperlipidemia    • Hypertension    • Sleep-disordered breathing 2022    Home sleep study.  Weight 120 pounds.  AHI normal at 3.9 events per hour.  No sleep-related hypoxia.  The patient snored 14% of total monitoring time.       Surgical History  Past Surgical History:   Procedure Laterality Date   •  SECTION     • COLONOSCOPY     • COLONOSCOPY N/A 2021    Procedure: COLONOSCOPY to cecum and TI with cold biopsy polypectomies;  Surgeon: Mustapha Valadez MD;  Location: [facility] ENDOSCOPY;  Service: Gastroenterology;  Laterality: N/A;  pre: family h/o colon cancer and personal h/o colon polyps, positive cologuard  post: polyps, hemorrhoids       Family History  Family History   Problem Relation Age of Onset   • Heart attack Father    • Hypertension Mother    • Heart disease Sister    • Diabetes Sister    • Malig Hyperthermia Neg Hx        Social History  Social History    Tobacco Use      Smoking status: Every Day        Packs/day: 0.25        Years: 20.00        Pack years: 5        Types: Cigarettes      Smokeless tobacco: Never       Is the Patient a current tobacco user? No    Allergies  No Known Allergies    Current Medications    Current Outpatient Medications:   •  Blood Glucose Monitoring Suppl (Accu-Chek Karma Plus) w/Device kit, Test daily before all meals/snacks and once before bedtime., Disp: 1 kit, Rfl: 0  •  glucose blood (Accu-Chek  Karma) test strip, Test daily before all meals/snacks and once before bedtime., Disp: 3 each, Rfl: 0  •  hydroCHLOROthiazide (HYDRODIURIL) 25 MG tablet, TAKE ONE TABLET BY MOUTH DAILY, Disp: 30 tablet, Rfl: 5  •  ibuprofen (ADVIL,MOTRIN) 600 MG tablet, Take 1 tablet by mouth Daily As Needed., Disp: , Rfl:   •  Lancets (accu-chek multiclix) lancets, Test daily before all meals/snacks and once before bedtime., Disp: 3 each, Rfl: 0  •  lisinopril (PRINIVIL,ZESTRIL) 40 MG tablet, TAKE ONE TABLET BY MOUTH DAILY, Disp: 30 tablet, Rfl: 2  •  metFORMIN (GLUCOPHAGE) 500 MG tablet, Take 1 tablet by mouth 2 (Two) Times a Day With Meals., Disp: 30 tablet, Rfl: 2  •  pantoprazole (PROTONIX) 20 MG EC tablet, TAKE ONE TABLET BY MOUTH DAILY, Disp: 30 tablet, Rfl: 5  •  ProAir  (90 Base) MCG/ACT inhaler, INHALE TWO PUFFS BY MOUTH FOUR TIMES A DAY, Disp: 8.5 g, Rfl: 2  •  rosuvastatin (Crestor) 10 MG tablet, Take 1 tablet daily, Disp: 90 tablet, Rfl: 1  •  vitamin E 100 UNIT capsule, Take 1 capsule by mouth Daily., Disp: , Rfl:   •  nitrofurantoin, macrocrystal-monohydrate, (Macrobid) 100 MG capsule, Take 1 capsule by mouth 2 (Two) Times a Day. (Patient not taking: Reported on 4/13/2023), Disp: 14 capsule, Rfl: 0  •  SITagliptin (Januvia) 50 MG tablet, Take 1 tablet by mouth Daily., Disp: 30 tablet, Rfl: 3     Review of System  Review of Systems   Constitutional: Negative for chills and fever.   Respiratory: Negative for cough and shortness of breath.    Cardiovascular: Negative for chest pain and palpitations.   Gastrointestinal: Negative for constipation, diarrhea, nausea and vomiting.   Neurological: Negative for dizziness and headache.     I have reviewed and confirmed the accuracy of the ROS as documented by the MA/LPN/RN Raymond Saldaña MD    Vitals:    04/13/23 1554   BP: 110/60   Pulse: 88   SpO2: 96%     Body mass index is 23.63 kg/m².    Objective     Physical Exam  Physical Exam  Constitutional:       General: She  is not in acute distress.     Appearance: Normal appearance.   HENT:      Head: Normocephalic and atraumatic.   Cardiovascular:      Rate and Rhythm: Normal rate and regular rhythm.   Pulmonary:      Effort: Pulmonary effort is normal. No respiratory distress.      Breath sounds: Normal breath sounds. No wheezing, rhonchi or rales.   Neurological:      General: No focal deficit present.      Mental Status: She is alert and oriented to person, place, and time.   Psychiatric:         Mood and Affect: Mood normal.         Behavior: Behavior normal.         Thought Content: Thought content normal.         Judgment: Judgment normal.             Raymond Saldaña MD  04/13/2023

## 2023-04-18 ENCOUNTER — TELEPHONE (OUTPATIENT)
Dept: FAMILY MEDICINE CLINIC | Facility: CLINIC | Age: 63
End: 2023-04-18

## 2023-04-18 NOTE — TELEPHONE ENCOUNTER
PATIENT STATES THAT HER INSURANCE WILL NOT COVER THE JANUVIA. THE PHARMACY SHOULD BE CONTACTING DR. NIX AS WELL.

## 2023-04-26 RX ORDER — PANTOPRAZOLE SODIUM 20 MG/1
TABLET, DELAYED RELEASE ORAL
Qty: 30 TABLET | Refills: 5 | Status: SHIPPED | OUTPATIENT
Start: 2023-04-26

## 2023-04-27 RX ORDER — PANTOPRAZOLE SODIUM 20 MG/1
TABLET, DELAYED RELEASE ORAL
Qty: 30 TABLET | Refills: 5 | OUTPATIENT
Start: 2023-04-27

## 2023-05-23 DIAGNOSIS — I10 ESSENTIAL HYPERTENSION: ICD-10-CM

## 2023-05-23 RX ORDER — HYDROCHLOROTHIAZIDE 25 MG/1
TABLET ORAL
Qty: 30 TABLET | Refills: 5 | Status: SHIPPED | OUTPATIENT
Start: 2023-05-23

## 2023-08-22 RX ORDER — BLOOD SUGAR DIAGNOSTIC
STRIP MISCELLANEOUS
Qty: 150 EACH | Refills: 3 | Status: SHIPPED | OUTPATIENT
Start: 2023-08-22

## 2023-08-22 RX ORDER — SITAGLIPTIN 50 MG/1
TABLET, FILM COATED ORAL
Qty: 30 TABLET | Refills: 3 | Status: SHIPPED | OUTPATIENT
Start: 2023-08-22 | End: 2023-08-28

## 2023-08-28 ENCOUNTER — OFFICE VISIT (OUTPATIENT)
Dept: FAMILY MEDICINE CLINIC | Facility: CLINIC | Age: 63
End: 2023-08-28
Payer: MEDICAID

## 2023-08-28 VITALS
WEIGHT: 115 LBS | OXYGEN SATURATION: 95 % | HEIGHT: 59 IN | TEMPERATURE: 98.8 F | HEART RATE: 86 BPM | BODY MASS INDEX: 23.18 KG/M2 | RESPIRATION RATE: 16 BRPM

## 2023-08-28 DIAGNOSIS — R05.9 COUGH, UNSPECIFIED TYPE: ICD-10-CM

## 2023-08-28 DIAGNOSIS — R52 BODY ACHES: Primary | ICD-10-CM

## 2023-08-28 DIAGNOSIS — N30.00 ACUTE CYSTITIS WITHOUT HEMATURIA: ICD-10-CM

## 2023-08-28 LAB
EXPIRATION DATE: NORMAL
FLUAV AG UPPER RESP QL IA.RAPID: NOT DETECTED
FLUBV AG UPPER RESP QL IA.RAPID: NOT DETECTED
GLUCOSE UR STRIP-MCNC: NEGATIVE MG/DL
INTERNAL CONTROL: NORMAL
Lab: NORMAL
SARS-COV-2 AG UPPER RESP QL IA.RAPID: NOT DETECTED

## 2023-08-28 RX ORDER — SITAGLIPTIN 50 MG/1
TABLET, FILM COATED ORAL
Qty: 30 TABLET | Refills: 3 | Status: SHIPPED | OUTPATIENT
Start: 2023-08-28 | End: 2023-08-29 | Stop reason: SDUPTHER

## 2023-08-28 RX ORDER — SULFAMETHOXAZOLE AND TRIMETHOPRIM 400; 80 MG/1; MG/1
1 TABLET ORAL 2 TIMES DAILY
Qty: 10 TABLET | Refills: 0 | Status: SHIPPED | OUTPATIENT
Start: 2023-08-28

## 2023-08-28 NOTE — PROGRESS NOTES
08/28/2023    Assessment & Plan     This 62-year-old patient presents today with complaint of chills x3 days.  She denies any fever but relates that when she urinates she does feel that her urine is very warm.  She relates she also developed cough today that is nonproductive in nature.  Accompanying her chills has been achiness in the back and polyuria.  She relates that the aches legs feel achy.    Her urine dip is nitrite positive with leukocytes 15, no blood per urine.    Patient's home COVID test was negative and in-house COVID test was negative as well.  Diagnoses and all orders for this visit:    1. Body aches (Primary)  -     POCT SARS-CoV-2 Antigen JJ    2. Acute cystitis without hematuria  -     POC Glucose, Urine, Qualitative, Dipstick    3. Cough, unspecified type  -     POCT SARS-CoV-2 Antigen JJ    Other orders  -     sulfamethoxazole-trimethoprim (Bactrim) 400-80 MG tablet; Take 1 tablet by mouth 2 (Two) Times a Day. (Patient not taking: Reported on 9/25/2023)  Dispense: 10 tablet; Refill: 0      Plan:  1.)  Bactrim DS 1 tab p.o. every 12  2.)  Drink at least 5 glasses of water per day.  3.)  Tylenol Extra Strength 1 tablet twice a day  3.)  Off work for the next 4 days.         CC: Generalized Body Aches (Took home covid test negative ), Cough, Fatigue, and Urinary Frequency  .        HPI  Cough    Fatigue  Associated symptoms include coughing and fatigue.   Urinary Frequency   Associated symptoms include frequency.      Anam Nieves is a 62 y.o. female.      The following portions of the patient's history were reviewed and updated as appropriate: allergies, current medications, past family history, past medical history, past social history, past surgical history, and problem list.    Problem List  Patient Active Problem List   Diagnosis    Colon cancer screening    Acute bronchitis with chronic obstructive pulmonary disease (COPD)    Family history of colon cancer in mother     Hyperlipidemia    Hypertension    Hypokalemia    Iliotibial band syndrome of right side    Low back pain radiating to right leg    Tobacco abuse    Trochanteric bursitis of right hip    Polyp of colon    Family history of polyps in the colon    Sleep-disordered breathing       Past Medical History  Past Medical History:   Diagnosis Date    Allergic     Few year ago    COPD (chronic obstructive pulmonary disease)     Few years ago    Family history of colon cancer     GERD (gastroesophageal reflux disease)     Few years ago    Hyperlipidemia     Hypertension     Sleep-disordered breathing 2022    Home sleep study.  Weight 120 pounds.  AHI normal at 3.9 events per hour.  No sleep-related hypoxia.  The patient snored 14% of total monitoring time.       Surgical History  Past Surgical History:   Procedure Laterality Date     SECTION      COLONOSCOPY      COLONOSCOPY N/A 2021    Procedure: COLONOSCOPY to cecum and TI with cold biopsy polypectomies;  Surgeon: Mustapha Valadez MD;  Location: [facility] ENDOSCOPY;  Service: Gastroenterology;  Laterality: N/A;  pre: family h/o colon cancer and personal h/o colon polyps, positive cologuard  post: polyps, hemorrhoids       Family History  Family History   Problem Relation Age of Onset    Heart attack Father     Heart disease Father     Hypertension Mother     Heart disease Sister     Diabetes Sister     Malig Hyperthermia Neg Hx        Social History  Social History    Tobacco Use      Smoking status: Every Day        Packs/day: 0.25        Years: 20.00        Pack years: 5        Types: Cigarettes      Smokeless tobacco: Never       Is the Patient a current tobacco user? No    Allergies  No Known Allergies    Current Medications    Current Outpatient Medications:     Blood Glucose Monitoring Suppl (Accu-Chek Karma Plus) w/Device kit, Test daily before all meals/snacks and once before bedtime., Disp: 1 kit, Rfl: 0    ibuprofen (ADVIL,MOTRIN) 600 MG tablet,  Take 1 tablet by mouth Daily As Needed., Disp: , Rfl:     Lancets (accu-chek multiclix) lancets, Test daily before all meals/snacks and once before bedtime., Disp: 3 each, Rfl: 0    multivitamin with minerals tablet tablet, Take 1 tablet by mouth Daily., Disp: , Rfl:     ProAir  (90 Base) MCG/ACT inhaler, INHALE TWO PUFFS BY MOUTH FOUR TIMES A DAY, Disp: 8.5 g, Rfl: 2    rosuvastatin (CRESTOR) 10 MG tablet, TAKE ONE TABLET BY MOUTH DAILY, Disp: 90 tablet, Rfl: 1    vitamin E 100 UNIT capsule, Take 1 capsule by mouth Daily., Disp: , Rfl:     fexofenadine (Allegra Allergy) 180 MG tablet, Take 1 tablet by mouth Daily. (Patient not taking: Reported on 7/13/2023), Disp: 30 tablet, Rfl: 3    glucose blood (OneTouch Ultra) test strip, CHECK BLOOD SUGAR BEFORE ALL MEALS AND SNACKS AND ONCE BEFORE BEDTIME, Disp: 150 each, Rfl: 3    hydroCHLOROthiazide (HYDRODIURIL) 25 MG tablet, Take 1 tablet by mouth Daily., Disp: 90 tablet, Rfl: 1    lisinopril (PRINIVIL,ZESTRIL) 40 MG tablet, Take 1 tablet by mouth Daily., Disp: 90 tablet, Rfl: 1    metFORMIN (GLUCOPHAGE) 500 MG tablet, Take 1 tablet by mouth 2 (Two) Times a Day With Meals. (Patient not taking: Reported on 7/13/2023), Disp: 30 tablet, Rfl: 2    nitrofurantoin, macrocrystal-monohydrate, (Macrobid) 100 MG capsule, Take 1 capsule by mouth 2 (Two) Times a Day. (Patient not taking: Reported on 4/13/2023), Disp: 14 capsule, Rfl: 0    pantoprazole (PROTONIX) 20 MG EC tablet, Take 1 tablet by mouth Daily., Disp: 90 tablet, Rfl: 1    SITagliptin (Januvia) 50 MG tablet, Take 1 tablet by mouth Daily., Disp: 90 tablet, Rfl: 1    sulfamethoxazole-trimethoprim (Bactrim) 400-80 MG tablet, Take 1 tablet by mouth 2 (Two) Times a Day. (Patient not taking: Reported on 9/25/2023), Disp: 10 tablet, Rfl: 0     Review of System  Review of Systems   Constitutional:  Positive for fatigue.   Respiratory:  Positive for cough.    Genitourinary:  Positive for frequency.   I have reviewed and  confirmed the accuracy of the ROS as documented by the MA/LPN/RN Raymond Saldaña MD    Vitals:    08/28/23 1536   Pulse: 86   Resp: 16   Temp: 98.8 °F (37.1 °C)   SpO2: 95%     Body mass index is 23.5 kg/m².    Objective     Physical Exam  Physical Exam  Constitutional:       General: She is not in acute distress.     Appearance: Normal appearance.   HENT:      Head: Normocephalic and atraumatic.      Nose: Nose normal.   Cardiovascular:      Rate and Rhythm: Normal rate and regular rhythm.   Pulmonary:      Effort: Pulmonary effort is normal. No respiratory distress.      Breath sounds: Normal breath sounds. No wheezing, rhonchi or rales.   Musculoskeletal:      Cervical back: Normal range of motion.   Skin:     General: Skin is warm.   Neurological:      General: No focal deficit present.      Mental Status: She is alert and oriented to person, place, and time.   Psychiatric:         Mood and Affect: Mood normal.         Behavior: Behavior normal.         Thought Content: Thought content normal.         Judgment: Judgment normal.           Raymond Saldaña MD  08/28/2023

## 2023-08-29 DIAGNOSIS — E11.65 TYPE 2 DIABETES MELLITUS WITH HYPERGLYCEMIA, WITHOUT LONG-TERM CURRENT USE OF INSULIN: Primary | ICD-10-CM

## 2023-08-29 DIAGNOSIS — I10 ESSENTIAL HYPERTENSION: ICD-10-CM

## 2023-08-29 RX ORDER — PANTOPRAZOLE SODIUM 20 MG/1
20 TABLET, DELAYED RELEASE ORAL DAILY
Qty: 90 TABLET | Refills: 1 | Status: SHIPPED | OUTPATIENT
Start: 2023-08-29

## 2023-08-29 RX ORDER — HYDROCHLOROTHIAZIDE 25 MG/1
25 TABLET ORAL DAILY
Qty: 90 TABLET | Refills: 1 | Status: SHIPPED | OUTPATIENT
Start: 2023-08-29

## 2023-08-29 RX ORDER — LISINOPRIL 40 MG/1
40 TABLET ORAL DAILY
Qty: 90 TABLET | Refills: 1 | Status: SHIPPED | OUTPATIENT
Start: 2023-08-29

## 2023-09-06 RX ORDER — BLOOD SUGAR DIAGNOSTIC
STRIP MISCELLANEOUS
Qty: 150 EACH | Refills: 3 | Status: SHIPPED | OUTPATIENT
Start: 2023-09-06

## 2023-09-25 ENCOUNTER — OFFICE VISIT (OUTPATIENT)
Dept: FAMILY MEDICINE CLINIC | Facility: CLINIC | Age: 63
End: 2023-09-25
Payer: MEDICAID

## 2023-09-25 VITALS
SYSTOLIC BLOOD PRESSURE: 158 MMHG | BODY MASS INDEX: 24.14 KG/M2 | WEIGHT: 115 LBS | HEIGHT: 58 IN | DIASTOLIC BLOOD PRESSURE: 76 MMHG | TEMPERATURE: 97.8 F

## 2023-09-25 DIAGNOSIS — J45.21 INTERMITTENT ASTHMA WITH ACUTE EXACERBATION, UNSPECIFIED ASTHMA SEVERITY: Primary | ICD-10-CM

## 2023-09-25 PROCEDURE — 3078F DIAST BP <80 MM HG: CPT | Performed by: INTERNAL MEDICINE

## 2023-09-25 PROCEDURE — 99214 OFFICE O/P EST MOD 30 MIN: CPT | Performed by: INTERNAL MEDICINE

## 2023-09-25 PROCEDURE — 3044F HG A1C LEVEL LT 7.0%: CPT | Performed by: INTERNAL MEDICINE

## 2023-09-25 PROCEDURE — 1160F RVW MEDS BY RX/DR IN RCRD: CPT | Performed by: INTERNAL MEDICINE

## 2023-09-25 PROCEDURE — 3077F SYST BP >= 140 MM HG: CPT | Performed by: INTERNAL MEDICINE

## 2023-09-25 PROCEDURE — 1159F MED LIST DOCD IN RCRD: CPT | Performed by: INTERNAL MEDICINE

## 2023-09-25 NOTE — PROGRESS NOTES
09/25/2023    Assessment & Plan   This 62-year-old patient presents today with complaint of wheezing and coughing x4 days.  She has a significant history of asthma and relates that she is having some painting done in her house and has noted that she has had coughing and shortness of breath since the paining started.  Her lungs are clear to auscultation and resonant percussion normal respiratory excursions were appreciated.  I feel that the volatile airborne organic chemicals are causing her acute exacerbation it is important that she be removed from the environment.  We have indicated this to her and she will try to stay somewhere for the next few days until the paining is done and the smell has dissipated.  We will also get her set up to have metered-dose inhaler for acute exacerbation of her asthma.        Diagnoses and all orders for this visit:    1. Intermittent asthma with acute exacerbation, unspecified asthma severity (Primary)         Plan:  1.)  The patient is asked to avoid the domicile at this time.  She can return when there is no evidence of any acute exacerbation of her coughing from the paint smell.  We will also make arrangements for her to get a mini nebulizer along with a metered-dose inhaler.    CC: URI (Wheezing and cough.  Ongoing issue, but getting worse.  Started 4 days ago.  Painting is being done at home.)  .        HPI  History of Present Illness     Subjective   Raquel Nieves is a 62 y.o. female.      The following portions of the patient's history were reviewed and updated as appropriate: allergies, current medications, past family history, past medical history, past social history, past surgical history, and problem list.    Problem List  Patient Active Problem List   Diagnosis    Colon cancer screening    Acute bronchitis with chronic obstructive pulmonary disease (COPD)    Family history of colon cancer in mother    Hyperlipidemia    Hypertension    Hypokalemia    Iliotibial band  syndrome of right side    Low back pain radiating to right leg    Tobacco abuse    Trochanteric bursitis of right hip    Polyp of colon    Family history of polyps in the colon    Sleep-disordered breathing       Past Medical History  Past Medical History:   Diagnosis Date    Allergic     Few year ago    COPD (chronic obstructive pulmonary disease)     Few years ago    Family history of colon cancer     GERD (gastroesophageal reflux disease)     Few years ago    Hyperlipidemia     Hypertension     Sleep-disordered breathing 2022    Home sleep study.  Weight 120 pounds.  AHI normal at 3.9 events per hour.  No sleep-related hypoxia.  The patient snored 14% of total monitoring time.       Surgical History  Past Surgical History:   Procedure Laterality Date     SECTION      COLONOSCOPY      COLONOSCOPY N/A 2021    Procedure: COLONOSCOPY to cecum and TI with cold biopsy polypectomies;  Surgeon: Mustapha Valadez MD;  Location: [facility] ENDOSCOPY;  Service: Gastroenterology;  Laterality: N/A;  pre: family h/o colon cancer and personal h/o colon polyps, positive cologuard  post: polyps, hemorrhoids       Family History  Family History   Problem Relation Age of Onset    Heart attack Father     Heart disease Father     Hypertension Mother     Heart disease Sister     Diabetes Sister     Malig Hyperthermia Neg Hx        Social History  Social History    Tobacco Use      Smoking status: Every Day        Packs/day: 0.25        Years: 20.00        Additional pack years: 0.00        Total pack years: 5.00        Types: Cigarettes      Smokeless tobacco: Never       Is the Patient a current tobacco user? No    Allergies  No Known Allergies    Current Medications    Current Outpatient Medications:     Blood Glucose Monitoring Suppl (Accu-Chek Karma Plus) w/Device kit, Test daily before all meals/snacks and once before bedtime., Disp: 1 kit, Rfl: 0    glucose blood (OneTouch Ultra) test strip, CHECK BLOOD SUGAR  BEFORE ALL MEALS AND SNACKS AND ONCE BEFORE BEDTIME, Disp: 150 each, Rfl: 3    hydroCHLOROthiazide (HYDRODIURIL) 25 MG tablet, Take 1 tablet by mouth Daily., Disp: 90 tablet, Rfl: 1    ibuprofen (ADVIL,MOTRIN) 600 MG tablet, Take 1 tablet by mouth Daily As Needed., Disp: , Rfl:     Lancets (accu-chek multiclix) lancets, Test daily before all meals/snacks and once before bedtime., Disp: 3 each, Rfl: 0    lisinopril (PRINIVIL,ZESTRIL) 40 MG tablet, Take 1 tablet by mouth Daily., Disp: 90 tablet, Rfl: 1    multivitamin with minerals tablet tablet, Take 1 tablet by mouth Daily., Disp: , Rfl:     pantoprazole (PROTONIX) 20 MG EC tablet, Take 1 tablet by mouth Daily., Disp: 90 tablet, Rfl: 1    ProAir  (90 Base) MCG/ACT inhaler, INHALE TWO PUFFS BY MOUTH FOUR TIMES A DAY, Disp: 8.5 g, Rfl: 2    rosuvastatin (CRESTOR) 10 MG tablet, TAKE ONE TABLET BY MOUTH DAILY, Disp: 90 tablet, Rfl: 1    SITagliptin (Januvia) 50 MG tablet, Take 1 tablet by mouth Daily., Disp: 90 tablet, Rfl: 1    vitamin E 100 UNIT capsule, Take 1 capsule by mouth Daily., Disp: , Rfl:     fexofenadine (Allegra Allergy) 180 MG tablet, Take 1 tablet by mouth Daily. (Patient not taking: Reported on 7/13/2023), Disp: 30 tablet, Rfl: 3    metFORMIN (GLUCOPHAGE) 500 MG tablet, Take 1 tablet by mouth 2 (Two) Times a Day With Meals. (Patient not taking: Reported on 7/13/2023), Disp: 30 tablet, Rfl: 2    nitrofurantoin, macrocrystal-monohydrate, (Macrobid) 100 MG capsule, Take 1 capsule by mouth 2 (Two) Times a Day. (Patient not taking: Reported on 4/13/2023), Disp: 14 capsule, Rfl: 0    sulfamethoxazole-trimethoprim (Bactrim) 400-80 MG tablet, Take 1 tablet by mouth 2 (Two) Times a Day. (Patient not taking: Reported on 9/25/2023), Disp: 10 tablet, Rfl: 0     Review of System  Review of Systems   Constitutional: Negative.  Negative for chills and fever.   HENT: Negative.     Respiratory: Negative.  Negative for cough and shortness of breath.     Cardiovascular: Negative.  Negative for chest pain and palpitations.   Gastrointestinal:  Negative for constipation, diarrhea, nausea and vomiting.   Neurological:  Negative for dizziness and headache.   I have reviewed and confirmed the accuracy of the ROS as documented by the MA/LPN/RN Raymond Saldaña MD    Vitals:    09/25/23 1506   BP: 158/76   Temp: 97.8 °F (36.6 °C)     Body mass index is 24.04 kg/m².    Objective     Physical Exam  Physical Exam  Constitutional:       General: She is not in acute distress.     Appearance: Normal appearance.   HENT:      Head: Normocephalic and atraumatic.   Cardiovascular:      Rate and Rhythm: Normal rate and regular rhythm.   Pulmonary:      Effort: Pulmonary effort is normal. No respiratory distress.      Breath sounds: Normal breath sounds. No wheezing, rhonchi or rales.   Neurological:      General: No focal deficit present.      Mental Status: She is alert and oriented to person, place, and time.   Psychiatric:         Mood and Affect: Mood normal.         Behavior: Behavior normal.         Thought Content: Thought content normal.         Judgment: Judgment normal.         Raymond Saldaña MD  09/25/2023

## 2023-10-09 NOTE — PROGRESS NOTES
2023    Assessment & Plan   Diagnoses and all orders for this visit:    1. Intermittent asthma with acute exacerbation, unspecified asthma severity (Primary)             CC: URI (Wheezing and cough.  Ongoing issue, but getting worse.  Started 4 days ago.  Painting is being done at home.)  .        HPI  URI          Subjective   Raquel Nieves is a 62 y.o. female.      The following portions of the patient's history were reviewed and updated as appropriate: allergies, current medications, past family history, past medical history, past social history, past surgical history, and problem list.    Problem List  Patient Active Problem List   Diagnosis    Colon cancer screening    Acute bronchitis with chronic obstructive pulmonary disease (COPD)    Family history of colon cancer in mother    Hyperlipidemia    Hypertension    Hypokalemia    Iliotibial band syndrome of right side    Low back pain radiating to right leg    Tobacco abuse    Trochanteric bursitis of right hip    Polyp of colon    Family history of polyps in the colon    Sleep-disordered breathing       Past Medical History  Past Medical History:   Diagnosis Date    Allergic     Few year ago    COPD (chronic obstructive pulmonary disease)     Few years ago    Family history of colon cancer     GERD (gastroesophageal reflux disease)     Few years ago    Hyperlipidemia     Hypertension     Sleep-disordered breathing 2022    Home sleep study.  Weight 120 pounds.  AHI normal at 3.9 events per hour.  No sleep-related hypoxia.  The patient snored 14% of total monitoring time.       Surgical History  Past Surgical History:   Procedure Laterality Date     SECTION      COLONOSCOPY      COLONOSCOPY N/A 2021    Procedure: COLONOSCOPY to cecum and TI with cold biopsy polypectomies;  Surgeon: Mustapha Valadez MD;  Location: [facility] ENDOSCOPY;  Service: Gastroenterology;  Laterality: N/A;  pre: family h/o colon cancer and personal h/o colon  polyps, positive cologuard  post: polyps, hemorrhoids       Family History  Family History   Problem Relation Age of Onset    Heart attack Father     Heart disease Father     Hypertension Mother     Heart disease Sister     Diabetes Sister     Malig Hyperthermia Neg Hx        Social History  Social History    Tobacco Use      Smoking status: Every Day        Packs/day: 0.25        Years: 20.00        Additional pack years: 0.00        Total pack years: 5.00        Types: Cigarettes      Smokeless tobacco: Never       Is the Patient a current tobacco user? No    Allergies  No Known Allergies    Current Medications    Current Outpatient Medications:     Blood Glucose Monitoring Suppl (Accu-Chek Karma Plus) w/Device kit, Test daily before all meals/snacks and once before bedtime., Disp: 1 kit, Rfl: 0    glucose blood (OneTouch Ultra) test strip, CHECK BLOOD SUGAR BEFORE ALL MEALS AND SNACKS AND ONCE BEFORE BEDTIME, Disp: 150 each, Rfl: 3    hydroCHLOROthiazide (HYDRODIURIL) 25 MG tablet, Take 1 tablet by mouth Daily., Disp: 90 tablet, Rfl: 1    ibuprofen (ADVIL,MOTRIN) 600 MG tablet, Take 1 tablet by mouth Daily As Needed., Disp: , Rfl:     Lancets (accu-chek multiclix) lancets, Test daily before all meals/snacks and once before bedtime., Disp: 3 each, Rfl: 0    lisinopril (PRINIVIL,ZESTRIL) 40 MG tablet, Take 1 tablet by mouth Daily., Disp: 90 tablet, Rfl: 1    multivitamin with minerals tablet tablet, Take 1 tablet by mouth Daily., Disp: , Rfl:     pantoprazole (PROTONIX) 20 MG EC tablet, Take 1 tablet by mouth Daily., Disp: 90 tablet, Rfl: 1    ProAir  (90 Base) MCG/ACT inhaler, INHALE TWO PUFFS BY MOUTH FOUR TIMES A DAY, Disp: 8.5 g, Rfl: 2    rosuvastatin (CRESTOR) 10 MG tablet, TAKE ONE TABLET BY MOUTH DAILY, Disp: 90 tablet, Rfl: 1    SITagliptin (Januvia) 50 MG tablet, Take 1 tablet by mouth Daily., Disp: 90 tablet, Rfl: 1    vitamin E 100 UNIT capsule, Take 1 capsule by mouth Daily., Disp: , Rfl:       Review of System  Review of Systems   HENT: Negative.     Eyes: Negative.    Respiratory: Negative.     Cardiovascular: Negative.      I have reviewed and confirmed the accuracy of the ROS as documented by the MA/LPN/RN Raymond Saldaña MD    Vitals:    09/25/23 1506   BP: 158/76   Temp: 97.8 °F (36.6 °C)     Body mass index is 24.04 kg/m².    Objective     Physical Exam  Physical Exam  Constitutional:       General: She is not in acute distress.     Appearance: Normal appearance.   HENT:      Head: Normocephalic and atraumatic.   Cardiovascular:      Rate and Rhythm: Normal rate and regular rhythm.   Pulmonary:      Effort: Pulmonary effort is normal. No respiratory distress.      Breath sounds: Normal breath sounds. No wheezing, rhonchi or rales.   Neurological:      General: No focal deficit present.      Mental Status: She is alert and oriented to person, place, and time.   Psychiatric:         Mood and Affect: Mood normal.         Behavior: Behavior normal.         Thought Content: Thought content normal.         Judgment: Judgment normal.           Raymond Saldaña MD  09/25/2023

## 2023-10-16 ENCOUNTER — OFFICE VISIT (OUTPATIENT)
Dept: FAMILY MEDICINE CLINIC | Facility: CLINIC | Age: 63
End: 2023-10-16
Payer: MEDICAID

## 2023-10-16 VITALS
DIASTOLIC BLOOD PRESSURE: 50 MMHG | SYSTOLIC BLOOD PRESSURE: 118 MMHG | WEIGHT: 116 LBS | BODY MASS INDEX: 24.35 KG/M2 | HEIGHT: 58 IN

## 2023-10-16 DIAGNOSIS — I10 ESSENTIAL HYPERTENSION: ICD-10-CM

## 2023-10-16 DIAGNOSIS — E11.65 TYPE 2 DIABETES MELLITUS WITH HYPERGLYCEMIA, WITHOUT LONG-TERM CURRENT USE OF INSULIN: ICD-10-CM

## 2023-10-16 DIAGNOSIS — Z23 NEED FOR INFLUENZA VACCINATION: ICD-10-CM

## 2023-10-16 DIAGNOSIS — J45.21 INTERMITTENT ASTHMA WITH ACUTE EXACERBATION, UNSPECIFIED ASTHMA SEVERITY: Primary | ICD-10-CM

## 2023-10-16 PROCEDURE — 1160F RVW MEDS BY RX/DR IN RCRD: CPT | Performed by: INTERNAL MEDICINE

## 2023-10-16 PROCEDURE — 3078F DIAST BP <80 MM HG: CPT | Performed by: INTERNAL MEDICINE

## 2023-10-16 PROCEDURE — 3074F SYST BP LT 130 MM HG: CPT | Performed by: INTERNAL MEDICINE

## 2023-10-16 PROCEDURE — 1159F MED LIST DOCD IN RCRD: CPT | Performed by: INTERNAL MEDICINE

## 2023-10-16 PROCEDURE — 3044F HG A1C LEVEL LT 7.0%: CPT | Performed by: INTERNAL MEDICINE

## 2023-10-16 RX ORDER — AMOXICILLIN 500 MG/1
CAPSULE ORAL
COMMUNITY
Start: 2023-10-12

## 2023-10-16 NOTE — PROGRESS NOTES
10/16/2023    Assessment & Plan     This 62-year-old presents at this time for follow-up of asthma.  With the last episode she was undergoing acute exacerbations of asthma after her  started painting the house.  She relates that after about a week of treatment she has only had to use the mini nebulizer once or twice.  She is back home.    Her blood pressure 118/50 in the left arm sitting position standard cuff.  Weight is essentially unchanged from the past at 116 pounds.  We will see her for follow-up of diabetes in 1 to 2 months.    Diagnoses and all orders for this visit:    1. Intermittent asthma with acute exacerbation, unspecified asthma severity (Primary)    2. Essential hypertension    3. Type 2 diabetes mellitus with hyperglycemia, without long-term current use of insulin      Plan:  1.)  Follow-up in 1 to 2 months for reevaluation of diabetes  2.)  Continue HCTZ and lisinopril for hypertension  3.)  Continue Januvia 50 mg 1 tab p.o. daily        CC: Asthma (F/U=----no other issues)  .        HPI  Asthma  There is no cough or shortness of breath. Pertinent negatives include no chest pain or fever. Her past medical history is significant for asthma.        Anam Nieves is a 62 y.o. female.      The following portions of the patient's history were reviewed and updated as appropriate: allergies, current medications, past family history, past medical history, past social history, past surgical history, and problem list.    Problem List  Patient Active Problem List   Diagnosis    Colon cancer screening    Acute bronchitis with chronic obstructive pulmonary disease (COPD)    Family history of colon cancer in mother    Hyperlipidemia    Hypertension    Hypokalemia    Iliotibial band syndrome of right side    Low back pain radiating to right leg    Tobacco abuse    Trochanteric bursitis of right hip    Polyp of colon    Family history of polyps in the colon    Sleep-disordered breathing        Past Medical History  Past Medical History:   Diagnosis Date    Allergic     Few year ago    COPD (chronic obstructive pulmonary disease)     Few years ago    Family history of colon cancer     GERD (gastroesophageal reflux disease)     Few years ago    Hyperlipidemia     Hypertension     Sleep-disordered breathing 2022    Home sleep study.  Weight 120 pounds.  AHI normal at 3.9 events per hour.  No sleep-related hypoxia.  The patient snored 14% of total monitoring time.       Surgical History  Past Surgical History:   Procedure Laterality Date     SECTION      COLONOSCOPY      COLONOSCOPY N/A 2021    Procedure: COLONOSCOPY to cecum and TI with cold biopsy polypectomies;  Surgeon: Mustapha Valadez MD;  Location: [facility] ENDOSCOPY;  Service: Gastroenterology;  Laterality: N/A;  pre: family h/o colon cancer and personal h/o colon polyps, positive cologuard  post: polyps, hemorrhoids       Family History  Family History   Problem Relation Age of Onset    Heart attack Father     Heart disease Father     Hypertension Mother     Heart disease Sister     Diabetes Sister     Malig Hyperthermia Neg Hx        Social History  Social History    Tobacco Use      Smoking status: Every Day        Packs/day: 0.25        Years: 20.00        Additional pack years: 0.00        Total pack years: 5.00        Types: Cigarettes      Smokeless tobacco: Never       Is the Patient a current tobacco user? No    Allergies  No Known Allergies    Current Medications    Current Outpatient Medications:     amoxicillin (AMOXIL) 500 MG capsule, , Disp: , Rfl:     Blood Glucose Monitoring Suppl (Accu-Chek Karma Plus) w/Device kit, Test daily before all meals/snacks and once before bedtime., Disp: 1 kit, Rfl: 0    glucose blood (OneTouch Ultra) test strip, CHECK BLOOD SUGAR BEFORE ALL MEALS AND SNACKS AND ONCE BEFORE BEDTIME, Disp: 150 each, Rfl: 3    hydroCHLOROthiazide (HYDRODIURIL) 25 MG tablet, Take 1 tablet by mouth  Daily., Disp: 90 tablet, Rfl: 1    ibuprofen (ADVIL,MOTRIN) 600 MG tablet, Take 1 tablet by mouth Daily As Needed., Disp: , Rfl:     Lancets (accu-chek multiclix) lancets, Test daily before all meals/snacks and once before bedtime., Disp: 3 each, Rfl: 0    lisinopril (PRINIVIL,ZESTRIL) 40 MG tablet, Take 1 tablet by mouth Daily., Disp: 90 tablet, Rfl: 1    multivitamin with minerals tablet tablet, Take 1 tablet by mouth Daily., Disp: , Rfl:     pantoprazole (PROTONIX) 20 MG EC tablet, Take 1 tablet by mouth Daily., Disp: 90 tablet, Rfl: 1    ProAir  (90 Base) MCG/ACT inhaler, INHALE TWO PUFFS BY MOUTH FOUR TIMES A DAY, Disp: 8.5 g, Rfl: 2    rosuvastatin (CRESTOR) 10 MG tablet, TAKE ONE TABLET BY MOUTH DAILY, Disp: 90 tablet, Rfl: 1    SITagliptin (Januvia) 50 MG tablet, Take 1 tablet by mouth Daily., Disp: 90 tablet, Rfl: 1    vitamin E 100 UNIT capsule, Take 1 capsule by mouth Daily., Disp: , Rfl:      Review of System  Review of Systems   Constitutional:  Negative for chills and fever.   Respiratory:  Negative for cough and shortness of breath.    Cardiovascular:  Negative for chest pain and palpitations.   Gastrointestinal:  Negative for constipation, diarrhea, nausea and vomiting.   Neurological:  Negative for dizziness and headache.     I have reviewed and confirmed the accuracy of the ROS as documented by the MA/LPN/RN Raymond Sadlaña MD    Vitals:    10/16/23 1459   BP: 118/50     Body mass index is 24.24 kg/m².    Objective     Physical Exam  Physical Exam  Constitutional:       General: She is not in acute distress.     Appearance: Normal appearance.   HENT:      Head: Normocephalic and atraumatic.   Cardiovascular:      Rate and Rhythm: Normal rate.   Pulmonary:      Effort: Pulmonary effort is normal. No respiratory distress.      Breath sounds: No wheezing, rhonchi or rales.   Neurological:      General: No focal deficit present.      Mental Status: She is alert and oriented to person, place,  and time.   Psychiatric:         Mood and Affect: Mood normal.         Behavior: Behavior normal.         Thought Content: Thought content normal.         Judgment: Judgment normal.             Raymond Saldaña MD  10/16/2023

## 2023-11-21 ENCOUNTER — OFFICE VISIT (OUTPATIENT)
Dept: OBSTETRICS AND GYNECOLOGY | Facility: CLINIC | Age: 63
End: 2023-11-21
Payer: MEDICAID

## 2023-11-21 ENCOUNTER — PROCEDURE VISIT (OUTPATIENT)
Dept: OBSTETRICS AND GYNECOLOGY | Facility: CLINIC | Age: 63
End: 2023-11-21
Payer: MEDICAID

## 2023-11-21 VITALS
SYSTOLIC BLOOD PRESSURE: 120 MMHG | DIASTOLIC BLOOD PRESSURE: 70 MMHG | HEIGHT: 58 IN | BODY MASS INDEX: 23.93 KG/M2 | WEIGHT: 114 LBS

## 2023-11-21 DIAGNOSIS — Z00.00 ANNUAL PHYSICAL EXAM: Primary | ICD-10-CM

## 2023-11-21 DIAGNOSIS — Z12.31 VISIT FOR SCREENING MAMMOGRAM: Primary | ICD-10-CM

## 2023-11-21 NOTE — PROGRESS NOTES
HALEY Nieves  is a 62 y.o. female who presents for a routine gynecologic exam.  Overall, she is feeling well.  She does have night sweats, but is not significantly bothered by them.  Mammogram was performed today.  The patient is current with colon cancer screening.    Chief Complaint   Patient presents with    Gynecologic Exam     Patient is here for a annual.       Past Medical History:   Diagnosis Date    Allergic     Few year ago    COPD (chronic obstructive pulmonary disease)     Few years ago    Family history of colon cancer     GERD (gastroesophageal reflux disease)     Few years ago    Hyperlipidemia     Hypertension     Sleep-disordered breathing 2022    Home sleep study.  Weight 120 pounds.  AHI normal at 3.9 events per hour.  No sleep-related hypoxia.  The patient snored 14% of total monitoring time.       Past Surgical History:   Procedure Laterality Date     SECTION      COLONOSCOPY      COLONOSCOPY N/A 2021    Procedure: COLONOSCOPY to cecum and TI with cold biopsy polypectomies;  Surgeon: Mustapha Valadez MD;  Location: [facility] ENDOSCOPY;  Service: Gastroenterology;  Laterality: N/A;  pre: family h/o colon cancer and personal h/o colon polyps, positive cologuard  post: polyps, hemorrhoids       Social History     Socioeconomic History    Marital status:    Tobacco Use    Smoking status: Every Day     Packs/day: 0.25     Years: 20.00     Additional pack years: 0.00     Total pack years: 5.00     Types: Cigarettes    Smokeless tobacco: Never   Vaping Use    Vaping Use: Never used   Substance and Sexual Activity    Alcohol use: Yes     Comment: Shots/Wine    Drug use: No    Sexual activity: Yes     Partners: Male       The following portions of the patient's history were reviewed and updated as appropriate: allergies, current medications, past family history, past medical history, past social history, past surgical history and problem list.    Review of Systems    Constitutional: Negative.    HENT: Negative.     Respiratory: Negative.     Cardiovascular: Negative.    Gastrointestinal: Negative.    Genitourinary: Negative.    Musculoskeletal: Negative.    Skin: Negative.    Allergic/Immunologic: Negative.    Psychiatric/Behavioral: Negative.               Physical Exam  Vitals and nursing note reviewed.   Constitutional:       Appearance: She is well-developed.   HENT:      Head: Normocephalic and atraumatic.   Cardiovascular:      Rate and Rhythm: Normal rate and regular rhythm.   Pulmonary:      Effort: Pulmonary effort is normal.      Breath sounds: Normal breath sounds. No wheezing or rales.   Chest:      Comments: The breasts are homogeneous.  There are no palpable lumps.  Nipple discharge and axillary adenopathy are absent.  Abdominal:      General: There is no distension.      Palpations: Abdomen is soft.      Tenderness: There is no abdominal tenderness.   Genitourinary:     Labia:         Right: No lesion.         Left: No lesion.       Vagina: Normal. No vaginal discharge.      Cervix: No cervical motion tenderness.      Uterus: Normal. Not enlarged and not tender.       Adnexa:         Right: No mass or tenderness.          Left: No mass or tenderness.     Skin:     General: Skin is warm and dry.   Neurological:      Mental Status: She is alert and oriented to person, place, and time.         Assessment    Annual examination    Plan  Annual examination performed  Counseled regarding the importance of regular screening mammograms.  Mammogram was performed today.  The patient is current with colon cancer screening  Hot flashes and night sweats.  Counseled.  The patient declines treatment for this.    Return in about 1 year (around 11/21/2024).    Social History     Tobacco Use   Smoking Status Every Day    Packs/day: 0.25    Years: 20.00    Additional pack years: 0.00    Total pack years: 5.00    Types: Cigarettes   Smokeless Tobacco Never

## 2023-12-20 RX ORDER — ROSUVASTATIN CALCIUM 10 MG/1
TABLET, COATED ORAL
Qty: 90 TABLET | Refills: 1 | Status: SHIPPED | OUTPATIENT
Start: 2023-12-20

## 2024-03-05 DIAGNOSIS — I10 ESSENTIAL HYPERTENSION: ICD-10-CM

## 2024-03-05 NOTE — TELEPHONE ENCOUNTER
PATIENT WOULD LIKE A CALL ONCE MEDICATION HAS BEEN SENT.    SHE STATED SHE ONLY HAS ONE PILL LEFT.

## 2024-03-05 NOTE — TELEPHONE ENCOUNTER
Rx Refill Note  Requested Prescriptions     Pending Prescriptions Disp Refills    lisinopril (PRINIVIL,ZESTRIL) 40 MG tablet [Pharmacy Med Name: LISINOPRIL 40 MG TABLET] 30 tablet      Sig: TAKE 1 TABLET BY MOUTH DAILY      Last office visit with prescribing clinician: 10/16/2023   Last telemedicine visit with prescribing clinician: Visit date not found   Next office visit with prescribing clinician: 4/2/2024                         Would you like a call back once the refill request has been completed: [] Yes [] No    If the office needs to give you a call back, can they leave a voicemail: [] Yes [] No    Shan Morris MA  03/05/24, 11:57 EST

## 2024-03-06 RX ORDER — LISINOPRIL 40 MG/1
40 TABLET ORAL DAILY
Qty: 30 TABLET | Refills: 3 | Status: SHIPPED | OUTPATIENT
Start: 2024-03-06

## 2024-03-16 NOTE — PROGRESS NOTES
2021    CC: Hypertension (f/u...no other issues)  .        HPI  Hypertension  This is a chronic problem. The current episode started more than 1 year ago. The problem is controlled. The current treatment provides significant improvement.        Anam Nieves is a 60 y.o. female.      The following portions of the patient's history were reviewed and updated as appropriate: allergies, current medications, past family history, past medical history, past social history, past surgical history and problem list.    Problem List  Patient Active Problem List   Diagnosis   • Colon cancer screening   • Acute bronchitis with chronic obstructive pulmonary disease (COPD) (CMS/[facility])   • Family history of colon cancer in mother   • Hyperlipidemia   • Hypertension   • Hypokalemia   • Iliotibial band syndrome of right side   • Low back pain radiating to right leg   • Tobacco abuse   • Trochanteric bursitis of right hip       Past Medical History  Past Medical History:   Diagnosis Date   • Hyperlipidemia    • Hypertension        Surgical History  Past Surgical History:   Procedure Laterality Date   •  SECTION         Family History  Family History   Problem Relation Age of Onset   • Heart attack Father    • Hypertension Mother    • Heart disease Sister    • Diabetes Sister        Social History  Social History    Tobacco Use      Smoking status: Current Some Day Smoker        Packs/day: 0.25        Years: 20.00        Pack years: 5      Smokeless tobacco: Never Used       Is the Patient a current tobacco user? No    Allergies  No Known Allergies    Current Medications    Current Outpatient Medications:   •  albuterol sulfate  (90 Base) MCG/ACT inhaler, Inhale 2 puffs., Disp: , Rfl:   •  hydroCHLOROthiazide (HYDRODIURIL) 12.5 MG tablet, Take 1 tablet by mouth Daily., Disp: 30 tablet, Rfl: 1  •  ibuprofen (ADVIL,MOTRIN) 600 MG tablet, Take 600 mg by mouth Daily As Needed., Disp: , Rfl:   •  lisinopril  (PRINIVIL,ZESTRIL) 40 MG tablet, TAKE ONE TABLET BY MOUTH DAILY, Disp: 30 tablet, Rfl: 2  •  MULTIPLE VITAMINS-MINERALS ER PO, Take 1 tablet by mouth Daily., Disp: , Rfl:   •  pantoprazole (PROTONIX) 20 MG EC tablet, , Disp: , Rfl:   •  vitamin E 100 UNIT capsule, Take 100 Units by mouth Daily., Disp: , Rfl:      Review of System  Review of Systems   Respiratory: Negative.    Cardiovascular: Negative.    Gastrointestinal: Negative.      I have reviewed and confirmed the accuracy of the ROS as documented by the MA/LPN/RN Raymond Saldaña MD    Vitals:    08/19/21 0804   BP: 130/84   Resp: 16     Body mass index is 24.56 kg/m².    Objective     Physical Exam  Physical Exam  HENT:      Nose: Nose normal.   Cardiovascular:      Rate and Rhythm: Normal rate and regular rhythm.      Pulses: Normal pulses.      Heart sounds: Normal heart sounds. No friction rub.   Pulmonary:      Effort: Pulmonary effort is normal.      Breath sounds: Normal breath sounds.   Abdominal:      General: Abdomen is flat.      Palpations: Abdomen is soft.   Musculoskeletal:         General: Normal range of motion.      Cervical back: Normal range of motion.         Assessment/Plan      This pleasant 60-year-old presents this time for follow-up of hypertension.  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  I feel that much of her status has led to her increased blood pressure and note is made today that her blood pressure is much improved.  Today is 118/80 in the left arm sitting position standard cuff, but she complains of new onset of tingling in both feet and hands for about a day or so.    Note is also made that she continues to smoke, although she states that she would like to start program to stop.    In the interim visits.  Patient had a: Guarded test done which was positive.  Note is made.  She had a polypectomy in 2019 and she was scheduled to have a repeat in 2022.  We will refer her to   Júnior, her GI doctor for recommendations.        Diagnoses and all orders for this visit:    1. Paresthesia of both feet (Primary)  -     Cancel: Vitamin B12; Future  -     Cancel: Folate; Future  -     Folate; Future  -     Vitamin B12    2. Essential hypertension    3. Smoker    plan:  1.)  Continue HCTZ 12.5 mg 1 tab by mouth daily.  #2.)  Continue lisinopril 40 mg by mouth daily  #3.)  Referral to Dr. Callejas for recommendations regarding positive Cordguard tests in the face of polypectomy done in 2019.  #4.)  We'll discuss smoking cessation with her next visit.         Raymond Saldaña MD  08/19/2021   None

## 2024-04-02 ENCOUNTER — OFFICE VISIT (OUTPATIENT)
Dept: FAMILY MEDICINE CLINIC | Facility: CLINIC | Age: 64
End: 2024-04-02
Payer: MEDICARE

## 2024-04-02 VITALS
SYSTOLIC BLOOD PRESSURE: 130 MMHG | BODY MASS INDEX: 24.35 KG/M2 | HEIGHT: 58 IN | WEIGHT: 116 LBS | DIASTOLIC BLOOD PRESSURE: 70 MMHG

## 2024-04-02 DIAGNOSIS — E11.65 TYPE 2 DIABETES MELLITUS WITH HYPERGLYCEMIA, WITHOUT LONG-TERM CURRENT USE OF INSULIN: Primary | ICD-10-CM

## 2024-04-02 DIAGNOSIS — I10 PRIMARY HYPERTENSION: ICD-10-CM

## 2024-04-02 DIAGNOSIS — F17.200 SMOKER: ICD-10-CM

## 2024-04-02 DIAGNOSIS — E78.5 HYPERLIPIDEMIA, UNSPECIFIED HYPERLIPIDEMIA TYPE: ICD-10-CM

## 2024-04-02 LAB
CHOLEST SERPL-MCNC: 177 MG/DL (ref 0–200)
EXPIRATION DATE: ABNORMAL
HBA1C MFR BLD: 6.3 % (ref 4.5–5.7)
HDLC SERPL-MCNC: 58 MG/DL (ref 40–60)
LDLC SERPL CALC-MCNC: 109 MG/DL (ref 0–100)
Lab: ABNORMAL
TRIGL SERPL-MCNC: 49 MG/DL (ref 0–150)
VLDLC SERPL CALC-MCNC: 10 MG/DL (ref 5–40)

## 2024-04-02 NOTE — PROGRESS NOTES
04/02/2024    Assessment & Plan   This 63-year-old patient presents today for follow-up of diabetes mellitus type 2 and hypertension.  She relates she is taking her medication as prescribed and has had no major problems.    Her blood pressure shows good control at 130/70 in the left arm sitting position standard cuff.     Her hemoglobin A1c shows good control at 6.3.  Her previous level was 6.1.    Diagnoses and all orders for this visit:    1. Type 2 diabetes mellitus with hyperglycemia, without long-term current use of insulin (Primary)  -     POCT glycated hemoglobin, total  -     MicroAlbumin, Urine, Random - Urine, Clean Catch; Future    2. Smoker  -     XR Chest PA & Lateral; Future    3. Hyperlipidemia, unspecified hyperlipidemia type  -     Lipid Panel    4. Primary hypertension      BMI is within normal parameters. No other follow-up for BMI required.    Plan:  1.)  Schedule for physical examination and next several months.  2.)  Continue lisinopril 40 mg 1 tab p.o. daily and HCTZ 25 mg 1 tab p.o. every morning for blood pressure control.  3.)  Follow-up in 3 to 4 months for diabetes follow-up.       CC: Hypertension (F/U.  Night sweats.) and Diabetes (F/U---no other issues)  .        HPI  History of Present Illness     Subjective   Raquel Nieves is a 63 y.o. female.      The following portions of the patient's history were reviewed and updated as appropriate: allergies, current medications, past family history, past medical history, past social history, past surgical history, and problem list.    Problem List  Patient Active Problem List   Diagnosis    Colon cancer screening    Acute bronchitis with chronic obstructive pulmonary disease (COPD)    Family history of colon cancer in mother    Hyperlipidemia    Hypertension    Hypokalemia    Iliotibial band syndrome of right side    Low back pain radiating to right leg    Tobacco abuse    Trochanteric bursitis of right hip    Polyp of colon    Family  history of polyps in the colon    Sleep-disordered breathing       Past Medical History  Past Medical History:   Diagnosis Date    Allergic     Few year ago    Asthma     COPD (chronic obstructive pulmonary disease)     Few years ago    Family history of colon cancer     GERD (gastroesophageal reflux disease)     Few years ago    Hyperlipidemia     Hypertension     Sleep-disordered breathing 2022    Home sleep study.  Weight 120 pounds.  AHI normal at 3.9 events per hour.  No sleep-related hypoxia.  The patient snored 14% of total monitoring time.       Surgical History  Past Surgical History:   Procedure Laterality Date     SECTION      COLONOSCOPY      COLONOSCOPY N/A 2021    Procedure: COLONOSCOPY to cecum and TI with cold biopsy polypectomies;  Surgeon: Mustapha Valadez MD;  Location: [facility] ENDOSCOPY;  Service: Gastroenterology;  Laterality: N/A;  pre: family h/o colon cancer and personal h/o colon polyps, positive cologuard  post: polyps, hemorrhoids       Family History  Family History   Problem Relation Age of Onset    Heart attack Father     Heart disease Father     Hypertension Mother     Heart disease Sister     Diabetes Sister     Malig Hyperthermia Neg Hx        Social History  Social History    Tobacco Use      Smoking status: Every Day        Packs/day: 0.25        Years: 0.3 packs/day for 20.0 years (5.0 ttl pk-yrs)        Types: Cigarettes      Smokeless tobacco: Never      Tobacco comments: Need help quitting       Is the Patient a current tobacco user? No    Allergies  No Known Allergies    Current Medications    Current Outpatient Medications:     Blood Glucose Monitoring Suppl (Accu-Chek Karma Plus) w/Device kit, Test daily before all meals/snacks and once before bedtime., Disp: 1 kit, Rfl: 0    glucose blood (OneTouch Ultra) test strip, CHECK BLOOD SUGAR BEFORE ALL MEALS AND SNACKS AND ONCE BEFORE BEDTIME, Disp: 150 each, Rfl: 3    hydroCHLOROthiazide (HYDRODIURIL) 25  MG tablet, Take 1 tablet by mouth Daily., Disp: 90 tablet, Rfl: 1    ibuprofen (ADVIL,MOTRIN) 600 MG tablet, Take 1 tablet by mouth Daily As Needed., Disp: , Rfl:     Lancets (accu-chek multiclix) lancets, Test daily before all meals/snacks and once before bedtime., Disp: 3 each, Rfl: 0    lisinopril (PRINIVIL,ZESTRIL) 40 MG tablet, TAKE 1 TABLET BY MOUTH DAILY, Disp: 30 tablet, Rfl: 3    multivitamin with minerals tablet tablet, Take 1 tablet by mouth Every Other Day., Disp: , Rfl:     ProAir  (90 Base) MCG/ACT inhaler, INHALE TWO PUFFS BY MOUTH FOUR TIMES A DAY, Disp: 8.5 g, Rfl: 2    rosuvastatin (CRESTOR) 10 MG tablet, TAKE 1 TABLET BY MOUTH DAILY, Disp: 90 tablet, Rfl: 1    SITagliptin (Januvia) 50 MG tablet, Take 1 tablet by mouth Daily., Disp: 90 tablet, Rfl: 1    vitamin E 100 UNIT capsule, Take 1 capsule by mouth Daily., Disp: , Rfl:     pantoprazole (PROTONIX) 20 MG EC tablet, TAKE ONE TABLET BY MOUTH DAILY, Disp: 30 tablet, Rfl: 4     Review of System  Review of Systems   Constitutional:  Negative for chills and fever.   Respiratory:  Negative for cough and shortness of breath.    Cardiovascular:  Negative for chest pain and palpitations.   Gastrointestinal:  Negative for constipation, diarrhea, nausea and vomiting.   Neurological:  Negative for dizziness and headache.     I have reviewed and confirmed the accuracy of the ROS as documented by the MA/LPN/RN Raymond Saldaña MD    Vitals:    04/02/24 1033   BP: 130/70     Body mass index is 24.24 kg/m².    Objective     Physical Exam  Physical Exam  Constitutional:       General: She is not in acute distress.     Appearance: Normal appearance.   HENT:      Head: Normocephalic and atraumatic.   Cardiovascular:      Rate and Rhythm: Normal rate and regular rhythm.   Pulmonary:      Effort: Pulmonary effort is normal. No respiratory distress.      Breath sounds: Normal breath sounds. No wheezing, rhonchi or rales.   Neurological:      General: No focal  deficit present.      Mental Status: She is alert and oriented to person, place, and time.   Psychiatric:         Mood and Affect: Mood normal.         Behavior: Behavior normal.         Thought Content: Thought content normal.         Judgment: Judgment normal.             Raymond Saldaña MD  04/02/2024

## 2024-04-04 NOTE — PROGRESS NOTES
Call patient to notify of results notify patient that her LDL/bad cholesterol has improved tremendously.  Continue her diet

## 2024-04-06 ENCOUNTER — HOSPITAL ENCOUNTER (OUTPATIENT)
Dept: GENERAL RADIOLOGY | Facility: HOSPITAL | Age: 64
Discharge: HOME | End: 2024-04-06
Payer: MEDICARE

## 2024-04-06 DIAGNOSIS — F17.200 SMOKER: ICD-10-CM

## 2024-04-06 PROCEDURE — 71046 X-RAY EXAM CHEST 2 VIEWS: CPT

## 2024-04-16 RX ORDER — PANTOPRAZOLE SODIUM 20 MG/1
20 TABLET, DELAYED RELEASE ORAL DAILY
Qty: 30 TABLET | Refills: 4 | Status: SHIPPED | OUTPATIENT
Start: 2024-04-16

## 2024-04-23 ENCOUNTER — OFFICE VISIT (OUTPATIENT)
Dept: FAMILY MEDICINE CLINIC | Facility: CLINIC | Age: 64
End: 2024-04-23
Payer: MEDICARE

## 2024-04-23 VITALS
WEIGHT: 117.2 LBS | BODY MASS INDEX: 24.6 KG/M2 | SYSTOLIC BLOOD PRESSURE: 120 MMHG | DIASTOLIC BLOOD PRESSURE: 58 MMHG | HEIGHT: 58 IN

## 2024-04-23 DIAGNOSIS — R35.1 NOCTURIA: ICD-10-CM

## 2024-04-23 DIAGNOSIS — Z00.00 WELCOME TO MEDICARE PREVENTIVE VISIT: ICD-10-CM

## 2024-04-23 DIAGNOSIS — I10 HYPERTENSION, UNSPECIFIED TYPE: Chronic | ICD-10-CM

## 2024-04-23 DIAGNOSIS — D64.9 ANEMIA, UNSPECIFIED TYPE: Primary | ICD-10-CM

## 2024-04-23 NOTE — PROGRESS NOTES
The ABCs of the Annual Wellness Visit  [place] to Medicare Visit    Chief Complaint   Patient presents with    Welcome To Medicare     No other issues     Subjective {   History of Present Illness:  Raquel Nieves is a 63 y.o. female who presents for a  Welcome to Medicare Visit.    The following portions of the patient's history were reviewed and   updated as appropriate: allergies, current medications, past family history, past medical history, past social history, past surgical history, and problem list.     Compared to one year ago, the patient feels her physical   health is the same.    Compared to one year ago, the patient feels her mental   health is the same.    Recent Hospitalizations:  She was not admitted to the hospital during the last year.       Current Medical Providers:  Patient Care Team:  Raymond Saldaña MD as PCP - General (Internal Medicine)    Outpatient Medications Prior to Visit   Medication Sig Dispense Refill    Blood Glucose Monitoring Suppl (Accu-Chek Karma Plus) w/Device kit Test daily before all meals/snacks and once before bedtime. 1 kit 0    glucose blood (OneTouch Ultra) test strip CHECK BLOOD SUGAR BEFORE ALL MEALS AND SNACKS AND ONCE BEFORE BEDTIME 150 each 3    ibuprofen (ADVIL,MOTRIN) 600 MG tablet Take 1 tablet by mouth Daily As Needed.      Lancets (accu-chek multiclix) lancets Test daily before all meals/snacks and once before bedtime. 3 each 0    lisinopril (PRINIVIL,ZESTRIL) 40 MG tablet TAKE 1 TABLET BY MOUTH DAILY 30 tablet 3    multivitamin with minerals tablet tablet Take 1 tablet by mouth Every Other Day.      pantoprazole (PROTONIX) 20 MG EC tablet TAKE ONE TABLET BY MOUTH DAILY 30 tablet 4    ProAir  (90 Base) MCG/ACT inhaler INHALE TWO PUFFS BY MOUTH FOUR TIMES A DAY 8.5 g 2    rosuvastatin (CRESTOR) 10 MG tablet TAKE 1 TABLET BY MOUTH DAILY 90 tablet 1    SITagliptin (Januvia) 50 MG tablet Take 1 tablet by mouth Daily. 90 tablet 1    vitamin E 100 UNIT  "capsule Take 1 capsule by mouth Daily.      hydroCHLOROthiazide (HYDRODIURIL) 25 MG tablet Take 1 tablet by mouth Daily. 90 tablet 1     No facility-administered medications prior to visit.       No opioid medication identified on active medication list. I have reviewed chart for other potential  high risk medication/s and harmful drug interactions in the elderly.        Aspirin is not on active medication list.  Aspirin use is not indicated based on review of current medical condition/s. Risk of harm outweighs potential benefits.  .    Patient Active Problem List   Diagnosis    Colon cancer screening    Acute bronchitis with chronic obstructive pulmonary disease (COPD)    Family history of colon cancer in mother    Hyperlipidemia    Hypertension    Hypokalemia    Iliotibial band syndrome of right side    Low back pain radiating to right leg    Tobacco abuse    Trochanteric bursitis of right hip    Polyp of colon    Family history of polyps in the colon    Sleep-disordered breathing     Advance Care Planning  Advance Directive is not on file.  ACP discussion was held with the patient during this visit. Patient does not have an advance directive, information provided.          Objective      Vitals:    04/23/24 1029   BP: 120/58   Weight: 53.2 kg (117 lb 3.2 oz)   Height: 147.3 cm (58\")     Estimated body mass index is 24.49 kg/m² as calculated from the following:    Height as of this encounter: 147.3 cm (58\").    Weight as of this encounter: 53.2 kg (117 lb 3.2 oz).    BMI is within normal parameters. No other follow-up for BMI required.      Does the patient have evidence of cognitive impairment? No    Physical Exam    Lab Results   Component Value Date    CHLPL 177 04/02/2024    TRIG 49 04/02/2024    HDL 58 04/02/2024     (H) 04/02/2024    VLDL 10 04/02/2024    HGBA1C 6.3 (A) 04/02/2024       Procedures       HEALTH RISK ASSESSMENT    Smoking Status:  Social History     Tobacco Use   Smoking Status Every " Day    Current packs/day: 0.25    Average packs/day: 0.3 packs/day for 20.0 years (5.0 ttl pk-yrs)    Types: Cigarettes   Smokeless Tobacco Never   Tobacco Comments    Need help quitting     Alcohol Consumption:  Social History     Substance and Sexual Activity   Alcohol Use Yes    Comment: Shots/Wine       Fall Risk Screen:    VLAD Fall Risk Assessment was completed, and patient is at LOW risk for falls.Assessment completed on:2024    Depression Screen:       2024    10:43 AM   PHQ-2/PHQ-9 Depression Screening   Little Interest or Pleasure in Doing Things 0-->not at all   Feeling Down, Depressed or Hopeless 0-->not at all   PHQ-9: Brief Depression Severity Measure Score 0       Health Habits and Functional and Cognitive Screenin/22/2024    12:45 PM   Functional & Cognitive Status   Do you have difficulty preparing food and eating? No   Do you have difficulty bathing yourself, getting dressed or grooming yourself? No   Do you have difficulty using the toilet? No   Do you have difficulty moving around from place to place? No   Do you have trouble with steps or getting out of a bed or a chair? No   Current Diet Well Balanced Diet   Dental Exam Not up to date   Eye Exam Not up to date   Exercise (times per week) 2 times per week   Current Exercises Include Home Fitness Gym;Treadmill;Walking   Do you need help using the phone?  No   Are you deaf or do you have serious difficulty hearing?  No   Do you need help to go to places out of walking distance? No   Do you need help shopping? No   Do you need help preparing meals?  No   Do you need help with housework?  No   Do you need help with laundry? No   Do you need help taking your medications? No   Do you need help managing money? No   Do you ever drive or ride in a car without wearing a seat belt? No   Have you felt unusual stress, anger or loneliness in the last month? No   Who do you live with? Spouse   If you need help, do you have trouble finding  someone available to you? No   Have you been bothered in the last four weeks by sexual problems? No   Do you have difficulty concentrating, remembering or making decisions? No       Visual Acuity:    Vision Screening    Right eye Left eye Both eyes   Without correction 20/50 20/50 20/40   With correction          Age-appropriate Screening Schedule:  Refer to the list below for future screening recommendations based on patient's age, sex and/or medical conditions. Orders for these recommended tests are listed in the plan section. The patient has been provided with a written plan.    Health Maintenance   Topic Date Due    DXA SCAN  Never done    DIABETIC EYE EXAM  Never done    COVID-19 Vaccine (6 - 2023-24 season) 02/26/2024    DIABETIC FOOT EXAM  04/13/2024    ZOSTER VACCINE (1 of 2) 04/02/2025 (Originally 12/3/2008)    INFLUENZA VACCINE  08/01/2024    HEMOGLOBIN A1C  10/02/2024    LIPID PANEL  04/02/2025    ANNUAL WELLNESS VISIT  04/23/2025    URINE MICROALBUMIN  04/23/2025    Pneumococcal Vaccine 65+ (3 of 3 - PPSV23 or PCV20) 07/16/2025    MAMMOGRAM  11/21/2025    COLORECTAL CANCER SCREENING  11/18/2026    PAP SMEAR  11/21/2026    TDAP/TD VACCINES (2 - Td or Tdap) 11/09/2030    HEPATITIS C SCREENING  Completed    RSV Vaccine - Adults  Completed          Assessment & Plan     This pleasant 63-year-old presents at this time for her welcome to Medicare visit.  She relates she is feeling well having had no problems in the interim of visits.    Regarding anticipatory guidance.  We discussed the importance of keeping her LDL cholesterol less than 100.  We gave a low-cholesterol diet sheet for implementation at our direction.    Her previous LDL was elevated at 180 her LDL as of 4/23 was 109.  We have asked her to start the low-dose cholesterol diet sheet program as I have outlined.    CMS Preventative Services Quick Reference  Risk Factors Identified During Encounter  None Identified  The above risks/problems have been  discussed with the patient.  Pertinent information has been shared with the patient in the After Visit Summary.  Follow up plans and orders are seen below in the Assessment/Plan Section.    Diagnoses and all orders for this visit:    1. Welcome to Medicare preventive visit (Primary)  -     ECG 12 Lead    2. Hypertension, unspecified type  -     Comprehensive Metabolic Panel    3. Nocturia  -     Urinalysis With Culture If Indicated -    4. Anemia, unspecified type  -     CBC & Differential    Other orders  -     Microscopic Examination -  -     Urine culture, Comprehensive - ,  -     MicroAlbumin, Urine, Random -        Follow Up:   No follow-ups on file.     An After Visit Summary and PPPS were made available to the patient.

## 2024-04-26 ENCOUNTER — PATIENT ROUNDING (BHMG ONLY) (OUTPATIENT)
Dept: FAMILY MEDICINE CLINIC | Facility: CLINIC | Age: 64
End: 2024-04-26
Payer: MEDICARE

## 2024-04-26 LAB
ALBUMIN SERPL-MCNC: 4.5 G/DL (ref 3.5–5.2)
ALBUMIN/GLOB SERPL: 1.6 G/DL
ALP SERPL-CCNC: 90 U/L (ref 39–117)
ALT SERPL-CCNC: 14 U/L (ref 1–33)
APPEARANCE UR: CLEAR
AST SERPL-CCNC: 19 U/L (ref 1–32)
BACTERIA #/AREA URNS HPF: ABNORMAL /[HPF]
BACTERIA UR CULT: ABNORMAL
BACTERIA UR CULT: ABNORMAL
BASOPHILS # BLD AUTO: 0.07 10*3/MM3 (ref 0–0.2)
BASOPHILS NFR BLD AUTO: 0.7 % (ref 0–1.5)
BILIRUB SERPL-MCNC: 0.5 MG/DL (ref 0–1.2)
BILIRUB UR QL STRIP: NEGATIVE
BUN SERPL-MCNC: 22 MG/DL (ref 8–23)
BUN/CREAT SERPL: 16.7 (ref 7–25)
CALCIUM SERPL-MCNC: 10.1 MG/DL (ref 8.6–10.5)
CASTS URNS QL MICRO: ABNORMAL /LPF
CHLORIDE SERPL-SCNC: 102 MMOL/L (ref 98–107)
CO2 SERPL-SCNC: 28.7 MMOL/L (ref 22–29)
COLOR UR: YELLOW
CREAT SERPL-MCNC: 1.32 MG/DL (ref 0.57–1)
EGFRCR SERPLBLD CKD-EPI 2021: 44.9 ML/MIN/1.73
EOSINOPHIL # BLD AUTO: 0.15 10*3/MM3 (ref 0–0.4)
EOSINOPHIL NFR BLD AUTO: 1.5 % (ref 0.3–6.2)
EPI CELLS #/AREA URNS HPF: ABNORMAL /HPF (ref 0–10)
ERYTHROCYTE [DISTWIDTH] IN BLOOD BY AUTOMATED COUNT: 13.4 % (ref 12.3–15.4)
GLOBULIN SER CALC-MCNC: 2.8 GM/DL
GLUCOSE SERPL-MCNC: 121 MG/DL (ref 65–99)
GLUCOSE UR QL STRIP: NEGATIVE
HCT VFR BLD AUTO: 38.2 % (ref 34–46.6)
HGB BLD-MCNC: 12.5 G/DL (ref 12–15.9)
HGB UR QL STRIP: NEGATIVE
IMM GRANULOCYTES # BLD AUTO: 0.03 10*3/MM3 (ref 0–0.05)
IMM GRANULOCYTES NFR BLD AUTO: 0.3 % (ref 0–0.5)
KETONES UR QL STRIP: NEGATIVE
LEUKOCYTE ESTERASE UR QL STRIP: ABNORMAL
LYMPHOCYTES # BLD AUTO: 3 10*3/MM3 (ref 0.7–3.1)
LYMPHOCYTES NFR BLD AUTO: 29.1 % (ref 19.6–45.3)
MCH RBC QN AUTO: 29 PG (ref 26.6–33)
MCHC RBC AUTO-ENTMCNC: 32.7 G/DL (ref 31.5–35.7)
MCV RBC AUTO: 88.6 FL (ref 79–97)
MICRO URNS: ABNORMAL
MICROALBUMIN UR-MCNC: 9.1 UG/ML
MONOCYTES # BLD AUTO: 0.73 10*3/MM3 (ref 0.1–0.9)
MONOCYTES NFR BLD AUTO: 7.1 % (ref 5–12)
NEUTROPHILS # BLD AUTO: 6.34 10*3/MM3 (ref 1.7–7)
NEUTROPHILS NFR BLD AUTO: 61.3 % (ref 42.7–76)
NITRITE UR QL STRIP: POSITIVE
NRBC BLD AUTO-RTO: 0 /100 WBC (ref 0–0.2)
OTHER ANTIBIOTIC SUSC ISLT: ABNORMAL
PH UR STRIP: 6 [PH] (ref 5–7.5)
PLATELET # BLD AUTO: 259 10*3/MM3 (ref 140–450)
POTASSIUM SERPL-SCNC: 3.8 MMOL/L (ref 3.5–5.2)
PROT SERPL-MCNC: 7.3 G/DL (ref 6–8.5)
PROT UR QL STRIP: NEGATIVE
RBC # BLD AUTO: 4.31 10*6/MM3 (ref 3.77–5.28)
RBC #/AREA URNS HPF: ABNORMAL /HPF (ref 0–2)
SODIUM SERPL-SCNC: 141 MMOL/L (ref 136–145)
SP GR UR STRIP: 1.01 (ref 1–1.03)
URINALYSIS REFLEX: ABNORMAL
UROBILINOGEN UR STRIP-MCNC: 0.2 MG/DL (ref 0.2–1)
WBC # BLD AUTO: 10.32 10*3/MM3 (ref 3.4–10.8)
WBC #/AREA URNS HPF: ABNORMAL /HPF (ref 0–5)

## 2024-04-26 NOTE — PROGRESS NOTES
A [employer] message has been sent to patient rounding with [facility].

## 2024-05-16 DIAGNOSIS — I10 ESSENTIAL HYPERTENSION: ICD-10-CM

## 2024-05-16 RX ORDER — HYDROCHLOROTHIAZIDE 25 MG/1
25 TABLET ORAL DAILY
Qty: 30 TABLET | Refills: 0 | Status: SHIPPED | OUTPATIENT
Start: 2024-05-16

## 2024-06-17 RX ORDER — ROSUVASTATIN CALCIUM 10 MG/1
TABLET, COATED ORAL
Qty: 90 TABLET | Refills: 1 | Status: SHIPPED | OUTPATIENT
Start: 2024-06-17

## 2024-06-18 DIAGNOSIS — I10 ESSENTIAL HYPERTENSION: ICD-10-CM

## 2024-06-18 RX ORDER — HYDROCHLOROTHIAZIDE 25 MG/1
25 TABLET ORAL DAILY
Qty: 30 TABLET | Refills: 5 | Status: SHIPPED | OUTPATIENT
Start: 2024-06-18

## 2024-07-03 DIAGNOSIS — I10 ESSENTIAL HYPERTENSION: ICD-10-CM

## 2024-07-03 RX ORDER — LISINOPRIL 40 MG/1
40 TABLET ORAL DAILY
Qty: 30 TABLET | Refills: 3 | Status: SHIPPED | OUTPATIENT
Start: 2024-07-03

## 2024-07-03 NOTE — TELEPHONE ENCOUNTER
Please review and advise as patient taking conflict popped up.   Results letter mailed to patient per   Colon recall entered for repeat in 3 yrs,10/26/2024  Colon done in 10/26/2021  HM Updated and Patient Outreach was placed for Colon recall. Delma Angelucci, MD  P [facility] Clinical Staff  GI RNs - 1.  P

## 2024-07-20 DIAGNOSIS — E11.65 TYPE 2 DIABETES MELLITUS WITH HYPERGLYCEMIA, WITHOUT LONG-TERM CURRENT USE OF INSULIN: ICD-10-CM

## 2024-07-22 RX ORDER — SITAGLIPTIN 50 MG/1
50 TABLET, FILM COATED ORAL DAILY
Qty: 30 TABLET | Refills: 5 | Status: SHIPPED | OUTPATIENT
Start: 2024-07-22

## 2024-08-06 ENCOUNTER — OFFICE VISIT (OUTPATIENT)
Dept: FAMILY MEDICINE CLINIC | Facility: CLINIC | Age: 64
End: 2024-08-06
Payer: MEDICARE

## 2024-08-06 VITALS
DIASTOLIC BLOOD PRESSURE: 62 MMHG | HEIGHT: 58 IN | SYSTOLIC BLOOD PRESSURE: 118 MMHG | BODY MASS INDEX: 23.93 KG/M2 | WEIGHT: 114 LBS

## 2024-08-06 DIAGNOSIS — I10 ESSENTIAL HYPERTENSION: ICD-10-CM

## 2024-08-06 DIAGNOSIS — E11.65 TYPE 2 DIABETES MELLITUS WITH HYPERGLYCEMIA, WITHOUT LONG-TERM CURRENT USE OF INSULIN: Primary | ICD-10-CM

## 2024-08-06 LAB
EXPIRATION DATE: ABNORMAL
HBA1C MFR BLD: 6.6 % (ref 4.5–5.7)
Lab: ABNORMAL

## 2024-08-06 PROCEDURE — 1159F MED LIST DOCD IN RCRD: CPT | Performed by: INTERNAL MEDICINE

## 2024-08-06 PROCEDURE — 83036 HEMOGLOBIN GLYCOSYLATED A1C: CPT | Performed by: INTERNAL MEDICINE

## 2024-08-06 PROCEDURE — 3044F HG A1C LEVEL LT 7.0%: CPT | Performed by: INTERNAL MEDICINE

## 2024-08-06 PROCEDURE — 3078F DIAST BP <80 MM HG: CPT | Performed by: INTERNAL MEDICINE

## 2024-08-06 PROCEDURE — 1160F RVW MEDS BY RX/DR IN RCRD: CPT | Performed by: INTERNAL MEDICINE

## 2024-08-06 PROCEDURE — 99214 OFFICE O/P EST MOD 30 MIN: CPT | Performed by: INTERNAL MEDICINE

## 2024-08-06 PROCEDURE — 3074F SYST BP LT 130 MM HG: CPT | Performed by: INTERNAL MEDICINE

## 2024-08-20 NOTE — PROGRESS NOTES
08/06/2024    Assessment & Plan   This pleasant 63-year-old presents at this time for follow-up of diabetes mellitus type 2.  She is in good spirits today relating that her adherence to her diet has been good.  She relates that she has been steadfast in taking her medication as prescribed.  She currently is on Januvia 50 mg 1 tab p.o. daily for type 2 diabetes.  She was intolerant of metformin.    Her previous hemoglobin A1c 4 months ago was excellent at 6.3.    Hemoglobin A1c today is 6.6.    Her blood pressure is 118/62 in the left arm sitting position standard cuff.  She is currently on HCTZ 25 mg daily plus lisinopril 40 mg p.o. daily for blood pressure control.    Diagnoses and all orders for this visit:    1. Type 2 diabetes mellitus with hyperglycemia, without long-term current use of insulin (Primary)  -     POCT glycated hemoglobin, total      BMI is within normal parameters. No other follow-up for BMI required.    Plan:  1.)  I do not think any changes needed with her medication regimen at this point.  Will see her back for follow-up in 4 to 5 months.             CC: Diabetes (F/U---no other issues)  .        HPI  History of Present Illness     Subjective   Raquel Nieves is a 63 y.o. female.      The following portions of the patient's history were reviewed and updated as appropriate: allergies, current medications, past family history, past medical history, past social history, past surgical history, and problem list.    Problem List  Patient Active Problem List   Diagnosis    Colon cancer screening    Acute bronchitis with chronic obstructive pulmonary disease (COPD)    Family history of colon cancer in mother    Hyperlipidemia    Hypertension    Hypokalemia    Iliotibial band syndrome of right side    Low back pain radiating to right leg    Tobacco abuse    Trochanteric bursitis of right hip    Polyp of colon    Family history of polyps in the colon    Sleep-disordered breathing       Past Medical  History  Past Medical History:   Diagnosis Date    Allergic     Few year ago    Asthma     COPD (chronic obstructive pulmonary disease)     Few years ago    Family history of colon cancer     GERD (gastroesophageal reflux disease)     Few years ago    Hyperlipidemia     Hypertension     Sleep-disordered breathing 2022    Home sleep study.  Weight 120 pounds.  AHI normal at 3.9 events per hour.  No sleep-related hypoxia.  The patient snored 14% of total monitoring time.       Surgical History  Past Surgical History:   Procedure Laterality Date     SECTION      COLONOSCOPY      COLONOSCOPY N/A 2021    Procedure: COLONOSCOPY to cecum and TI with cold biopsy polypectomies;  Surgeon: Mustapha Valadez MD;  Location: [facility] ENDOSCOPY;  Service: Gastroenterology;  Laterality: N/A;  pre: family h/o colon cancer and personal h/o colon polyps, positive cologuard  post: polyps, hemorrhoids       Family History  Family History   Problem Relation Age of Onset    Heart attack Father     Heart disease Father     Hypertension Mother     Heart disease Sister     Diabetes Sister     [place] Hyperthermia Neg Hx        Social History  Social History    Tobacco Use      Smoking status: Every Day        Packs/day: 0.25        Years: 0.3 packs/day for 20.0 years (5.0 ttl pk-yrs)        Types: Cigarettes      Smokeless tobacco: Never      Tobacco comments: Need help quitting       Is the Patient a current tobacco user? No    Allergies  No Known Allergies    Current Medications    Current Outpatient Medications:     Blood Glucose Monitoring Suppl (Accu-Chek Karma Plus) w/Device kit, Test daily before all meals/snacks and once before bedtime., Disp: 1 kit, Rfl: 0    glucose blood (OneTouch Ultra) test strip, CHECK BLOOD SUGAR BEFORE ALL MEALS AND SNACKS AND ONCE BEFORE BEDTIME, Disp: 150 each, Rfl: 3    hydroCHLOROthiazide 25 MG tablet, TAKE 1 TABLET BY MOUTH DAILY, Disp: 30 tablet, Rfl: 5    ibuprofen (ADVIL,MOTRIN)  600 MG tablet, Take 1 tablet by mouth Daily As Needed., Disp: , Rfl:     Lancets (accu-chek multiclix) lancets, Test daily before all meals/snacks and once before bedtime., Disp: 3 each, Rfl: 0    lisinopril (PRINIVIL,ZESTRIL) 40 MG tablet, TAKE 1 TABLET BY MOUTH DAILY, Disp: 30 tablet, Rfl: 3    multivitamin with minerals tablet tablet, Take 1 tablet by mouth Every Other Day., Disp: , Rfl:     pantoprazole (PROTONIX) 20 MG EC tablet, TAKE ONE TABLET BY MOUTH DAILY, Disp: 30 tablet, Rfl: 4    ProAir  (90 Base) MCG/ACT inhaler, INHALE TWO PUFFS BY MOUTH FOUR TIMES A DAY, Disp: 8.5 g, Rfl: 2    rosuvastatin (CRESTOR) 10 MG tablet, TAKE 1 TABLET BY MOUTH DAILY, Disp: 90 tablet, Rfl: 1    SITagliptin (Januvia) 50 MG tablet, TAKE 1 TABLET BY MOUTH DAILY, Disp: 30 tablet, Rfl: 5    vitamin E 100 UNIT capsule, Take 1 capsule by mouth Daily., Disp: , Rfl:      Review of System  Review of Systems  I have reviewed and confirmed the accuracy of the ROS as documented by the MA/LPN/RN Raymond Saldaña MD    Vitals:    08/06/24 1029   BP: 118/62     Body mass index is 23.83 kg/m².    Objective     Physical Exam  Physical Exam        Raymond Saldaña MD  08/06/2024

## 2024-08-28 ENCOUNTER — TELEPHONE (OUTPATIENT)
Dept: FAMILY MEDICINE CLINIC | Facility: CLINIC | Age: 64
End: 2024-08-28
Payer: MEDICARE

## 2024-08-28 NOTE — TELEPHONE ENCOUNTER
Sergio from [facility] stopped by office and stated they wanted to know since it been a year if you would like for this mutual patient to have a pulse oximetry home sleep testing , if you have any question you can reach him at [phone]

## 2024-09-17 DIAGNOSIS — E11.65 TYPE 2 DIABETES MELLITUS WITH HYPERGLYCEMIA, WITHOUT LONG-TERM CURRENT USE OF INSULIN: ICD-10-CM

## 2024-09-17 RX ORDER — SITAGLIPTIN 50 MG/1
50 TABLET, FILM COATED ORAL DAILY
Qty: 30 TABLET | Refills: 5 | Status: SHIPPED | OUTPATIENT
Start: 2024-09-17 | End: 2024-09-19

## 2024-09-19 DIAGNOSIS — E11.65 TYPE 2 DIABETES MELLITUS WITH HYPERGLYCEMIA, WITHOUT LONG-TERM CURRENT USE OF INSULIN: ICD-10-CM

## 2024-09-19 RX ORDER — SITAGLIPTIN 50 MG/1
50 TABLET, FILM COATED ORAL DAILY
Qty: 30 TABLET | Refills: 5 | Status: SHIPPED | OUTPATIENT
Start: 2024-09-19

## 2024-10-23 RX ORDER — PANTOPRAZOLE SODIUM 20 MG/1
20 TABLET, DELAYED RELEASE ORAL DAILY
Qty: 30 TABLET | Refills: 4 | Status: SHIPPED | OUTPATIENT
Start: 2024-10-23

## 2024-10-24 ENCOUNTER — EXTERNAL PBMM DATA (OUTPATIENT)
Dept: PHARMACY | Facility: OTHER | Age: 64
End: 2024-10-24
Payer: MEDICARE

## 2024-11-26 ENCOUNTER — PROCEDURE VISIT (OUTPATIENT)
Dept: OBSTETRICS AND GYNECOLOGY | Facility: CLINIC | Age: 64
End: 2024-11-26
Payer: MEDICARE

## 2024-11-26 ENCOUNTER — OFFICE VISIT (OUTPATIENT)
Dept: OBSTETRICS AND GYNECOLOGY | Facility: CLINIC | Age: 64
End: 2024-11-26
Payer: MEDICARE

## 2024-11-26 VITALS — WEIGHT: 113 LBS | DIASTOLIC BLOOD PRESSURE: 68 MMHG | SYSTOLIC BLOOD PRESSURE: 112 MMHG | BODY MASS INDEX: 23.62 KG/M2

## 2024-11-26 DIAGNOSIS — I10 ESSENTIAL HYPERTENSION: ICD-10-CM

## 2024-11-26 DIAGNOSIS — Z12.31 VISIT FOR SCREENING MAMMOGRAM: Primary | ICD-10-CM

## 2024-11-26 DIAGNOSIS — Z00.00 ANNUAL PHYSICAL EXAM: Primary | ICD-10-CM

## 2024-11-26 RX ORDER — LISINOPRIL 40 MG/1
40 TABLET ORAL DAILY
Qty: 30 TABLET | Refills: 3 | Status: SHIPPED | OUTPATIENT
Start: 2024-11-26

## 2024-11-26 NOTE — PROGRESS NOTES
HALEY Nieves  is a 63 y.o. female who presents for a routine gynecologic exam.  Overall, she is feeling well.  Bowels and bladder are functioning normally.  Mammogram was performed today.  The patient is current with colon cancer screening.    Chief Complaint   Patient presents with    Annual Exam     Brandon today    colon        Past Medical History:   Diagnosis Date    Allergic     Few year ago    Asthma     COPD (chronic obstructive pulmonary disease)     Few years ago    Diabetes mellitus     2 years ago    Family history of colon cancer     GERD (gastroesophageal reflux disease)     Few years ago    Hyperlipidemia     Hypertension     Sleep-disordered breathing 2022    Home sleep study.  Weight 120 pounds.  AHI normal at 3.9 events per hour.  No sleep-related hypoxia.  The patient snored 14% of total monitoring time.       Past Surgical History:   Procedure Laterality Date     SECTION      COLONOSCOPY      COLONOSCOPY N/A 2021    Procedure: COLONOSCOPY to cecum and TI with cold biopsy polypectomies;  Surgeon: Mustapha Valadez MD;  Location: [facility] ENDOSCOPY;  Service: Gastroenterology;  Laterality: N/A;  pre: family h/o colon cancer and personal h/o colon polyps, positive cologuard  post: polyps, hemorrhoids    WISDOM TOOTH EXTRACTION      Years ago       Social History     Socioeconomic History    Marital status:    Tobacco Use    Smoking status: Every Day     Current packs/day: 0.25     Average packs/day: 0.3 packs/day for 20.0 years (5.0 ttl pk-yrs)     Types: Cigarettes    Smokeless tobacco: Never    Tobacco comments:     During my college years   Vaping Use    Vaping status: Never Used   Substance and Sexual Activity    Alcohol use: Yes     Comment: Shots/Wine    Drug use: No    Sexual activity: Yes     Partners: Male     Birth control/protection: Tubal ligation       The following portions of the patient's history were reviewed and updated as appropriate:  allergies, current medications, past family history, past medical history, past social history, past surgical history and problem list.    Review of Systems   Constitutional: Negative.    HENT: Negative.     Respiratory: Negative.     Cardiovascular: Negative.    Gastrointestinal: Negative.    Genitourinary: Negative.    Musculoskeletal: Negative.    Skin: Negative.    Allergic/Immunologic: Negative.    Psychiatric/Behavioral: Negative.           Patient reports that she is not currently experiencing any symptoms of urinary incontinence.      Physical Exam  Vitals and nursing note reviewed.   Constitutional:       Appearance: She is well-developed.   HENT:      Head: Normocephalic and atraumatic.   Cardiovascular:      Rate and Rhythm: Normal rate and regular rhythm.   Pulmonary:      Effort: Pulmonary effort is normal.      Breath sounds: Normal breath sounds. No wheezing or rales.   Chest:      Comments: The breasts are homogeneous.  There are no palpable lumps.  Nipple discharge and axillary adenopathy are absent.  Abdominal:      General: There is no distension.      Palpations: Abdomen is soft.      Tenderness: There is no abdominal tenderness.   Genitourinary:     Labia:         Right: No lesion.         Left: No lesion.       Comments: The urethral meatus is normal  The urethra is normal  Bartholin's and Bethesda's glands are normal  The vagina is atrophic, but without lesion.  Vaginal support is adequate.  The cervix is small and nontender.  The uterus is small and mobile within the pelvis.  No pelvic masses are palpable.  Skin:     General: Skin is warm and dry.   Neurological:      Mental Status: She is alert and oriented to person, place, and time.         Assessment    Diagnoses and all orders for this visit:    1. Annual physical exam (Primary)        Plan  Annual examination performed  Counseled regarding recommendations for discontinuation of the Pap smear at age 63.  The patient is at low risk and she  agrees with this recommendation.  Counseled regarding the importance of regular screening mammograms.  Mammogram was performed today.  The patient is current with colon cancer screening.    Return in about 2 years (around 11/26/2026).    Social History     Tobacco Use   Smoking Status Every Day    Current packs/day: 0.25    Average packs/day: 0.3 packs/day for 20.0 years (5.0 ttl pk-yrs)    Types: Cigarettes   Smokeless Tobacco Never   Tobacco Comments    During my college years

## 2024-12-16 RX ORDER — ROSUVASTATIN CALCIUM 10 MG/1
TABLET, COATED ORAL
Qty: 90 TABLET | Refills: 1 | Status: SHIPPED | OUTPATIENT
Start: 2024-12-16

## 2024-12-25 DIAGNOSIS — I10 ESSENTIAL HYPERTENSION: ICD-10-CM

## 2024-12-26 RX ORDER — HYDROCHLOROTHIAZIDE 25 MG/1
25 TABLET ORAL DAILY
Qty: 90 TABLET | Refills: 1 | Status: SHIPPED | OUTPATIENT
Start: 2024-12-26

## 2025-02-11 RX ORDER — BLOOD SUGAR DIAGNOSTIC
STRIP MISCELLANEOUS
Qty: 150 EACH | Refills: 3 | Status: SHIPPED | OUTPATIENT
Start: 2025-02-11

## 2025-02-11 NOTE — TELEPHONE ENCOUNTER
Caller: Ezequiel Raquel    Relationship: Self    Best call back number: [phone]     Requested Prescriptions:   Requested Prescriptions     Pending Prescriptions Disp Refills    glucose blood (OneTouch Ultra) test strip 150 each 3     Sig: CHECK BLOOD SUGAR BEFORE ALL MEALS AND SNACKS AND ONCE BEFORE BEDTIME        Pharmacy where request should be sent: [facility] [id number] [place] [id number] [facility] - [phone]  - [phone] FX     Last office visit with prescribing clinician: 8/6/2024   Last telemedicine visit with prescribing clinician: Visit date not found   Next office visit with prescribing clinician: 2/13/2025     Does the patient have less than a 3 day supply:  [x] Yes  [] No    Would you like a call back once the refill request has been completed: [x] Yes [] No    If the office needs to give you a call back, can they leave a voicemail: [x] Yes [] No    Tonny Soliman Rep   02/11/25 09:51 EST

## 2025-02-13 ENCOUNTER — OFFICE VISIT (OUTPATIENT)
Dept: FAMILY MEDICINE CLINIC | Facility: CLINIC | Age: 65
End: 2025-02-13
Payer: MEDICARE

## 2025-02-13 VITALS
HEIGHT: 58 IN | SYSTOLIC BLOOD PRESSURE: 120 MMHG | DIASTOLIC BLOOD PRESSURE: 62 MMHG | WEIGHT: 114 LBS | HEART RATE: 95 BPM | BODY MASS INDEX: 23.93 KG/M2 | OXYGEN SATURATION: 100 %

## 2025-02-13 DIAGNOSIS — Z09 ENCOUNTER FOR FOLLOW-UP EXAMINATION AFTER COMPLETED TREATMENT FOR CONDITIONS OTHER THAN MALIGNANT NEOPLASM: ICD-10-CM

## 2025-02-13 DIAGNOSIS — E11.65 TYPE 2 DIABETES MELLITUS WITH HYPERGLYCEMIA, WITHOUT LONG-TERM CURRENT USE OF INSULIN: ICD-10-CM

## 2025-02-13 LAB
EXPIRATION DATE: ABNORMAL
HBA1C MFR BLD: 6.5 % (ref 4.5–5.7)
Lab: ABNORMAL

## 2025-02-13 PROCEDURE — 3074F SYST BP LT 130 MM HG: CPT | Performed by: INTERNAL MEDICINE

## 2025-02-13 PROCEDURE — 3044F HG A1C LEVEL LT 7.0%: CPT | Performed by: INTERNAL MEDICINE

## 2025-02-13 PROCEDURE — 99213 OFFICE O/P EST LOW 20 MIN: CPT | Performed by: INTERNAL MEDICINE

## 2025-02-13 PROCEDURE — 83036 HEMOGLOBIN GLYCOSYLATED A1C: CPT | Performed by: INTERNAL MEDICINE

## 2025-02-13 PROCEDURE — 1160F RVW MEDS BY RX/DR IN RCRD: CPT | Performed by: INTERNAL MEDICINE

## 2025-02-13 PROCEDURE — 1159F MED LIST DOCD IN RCRD: CPT | Performed by: INTERNAL MEDICINE

## 2025-02-13 PROCEDURE — 3078F DIAST BP <80 MM HG: CPT | Performed by: INTERNAL MEDICINE

## 2025-02-13 NOTE — PROGRESS NOTES
02/13/2025    Summarization of Visit         Assessment & Plan  1. Type 2 Diabetes Mellitus.  Her diabetes is well-managed, as evidenced by her stable weight and satisfactory blood pressure readings. The lightheadedness she experiences could be attributed to missed meals, which is a common issue among diabetic patients. She was advised to maintain a consistent eating schedule and to carry snacks such as peanut butter crackers to prevent hypoglycemic episodes. A lipid profile will be conducted during her next visit.    2. Health Maintenance.  A DEXA scan will be scheduled for her. Her wellness check is due in April, but it will be postponed until May to coincide with her diabetic follow-up.    Results  Laboratory Studies  Hemoglobin A1c is 6.5.    BMI is within normal parameters. No other follow-up for BMI required.           CC: Diabetes (F/U.  Occasional lightheadedness and loss of appetite.---no other issues)  .        HPI  Diabetes  Pertinent negatives for hypoglycemia include no dizziness. Pertinent negatives for diabetes include no chest pain.    History of Present Illness  The patient presents for a follow-up of type 2 diabetes mellitus.    She has been unable to monitor her blood glucose levels at home due to a lack of test strips, despite multiple attempts to procure them from the pharmacy. Consequently, she has been utilizing her 's glucometer for self-monitoring. Her blood glucose levels have been consistently within the range of 112 to 117, with the highest recorded level being 121 over the past 2 weeks. She reports experiencing occasional episodes of lightheadedness, which she attributes to her medication regimen. Over the past 2 to 3 months, she has noticed a decrease in her appetite, leading to a weight loss of 2 pounds. Her current weight is 114 pounds. She recalls an instance where she consumed yogurt for breakfast but skipped lunch, only to eat dinner at 6:30 PM. She acknowledges that her   encourages her to maintain regular meals, but she often lacks the desire to eat. She typically consumes a small lunch and dinner, but has been missing lunch over the past few months.    She had her eyes checked in October and they did change her prescription. She does have new glasses. They were just saying her left eye needs a little focal in there. She has been seeing floaters for the last year or so. They do not recommend any surgery or anything. Sometimes they come and go. She was told not to be too concerned with that.    MEDICATIONS  Current: Nguyen Mendieta   Raquel Nieves is a 64 y.o. female.      The following portions of the patient's history were reviewed and updated as appropriate: allergies, current medications, past family history, past medical history, past social history, past surgical history, and problem list.    Problem List  Patient Active Problem List   Diagnosis    Colon cancer screening    Acute bronchitis with chronic obstructive pulmonary disease (COPD)    Family history of colon cancer in mother    Hyperlipidemia    Hypertension    Hypokalemia    Iliotibial band syndrome of right side    Low back pain radiating to right leg    Tobacco abuse    Trochanteric bursitis of right hip    Polyp of colon    Family history of polyps in the colon    Sleep-disordered breathing       Past Medical History  Past Medical History:   Diagnosis Date    Allergic     Few year ago    Asthma     COPD (chronic obstructive pulmonary disease)     Few years ago    Diabetes mellitus     2 years ago    Family history of colon cancer     GERD (gastroesophageal reflux disease)     Few years ago    Hyperlipidemia     Hypertension     Sleep-disordered breathing 09/20/2022    Home sleep study.  Weight 120 pounds.  AHI normal at 3.9 events per hour.  No sleep-related hypoxia.  The patient snored 14% of total monitoring time.       Surgical History  Past Surgical History:   Procedure Laterality Date      SECTION      COLONOSCOPY      COLONOSCOPY N/A 2021    Procedure: COLONOSCOPY to cecum and TI with cold biopsy polypectomies;  Surgeon: Mustapha Valadez MD;  Location: [facility] ENDOSCOPY;  Service: Gastroenterology;  Laterality: N/A;  pre: family h/o colon cancer and personal h/o colon polyps, positive cologuard  post: polyps, hemorrhoids    WISDOM TOOTH EXTRACTION      Years ago       Family History  Family History   Problem Relation Age of Onset    Heart attack Father     Heart disease Father     Hypertension Mother     Colon cancer Mother     Heart disease Sister     Diabetes Sister     Stroke Sister         [place] Hyperthermia Neg Hx        Social History  Social History    Tobacco Use      Smoking status: Every Day        Packs/day: 0.25        Years: 0.3 packs/day for 20.0 years (5.0 ttl pk-yrs)        Types: Cigarettes      Smokeless tobacco: Never      Tobacco comments: During my college years       Is the Patient a current tobacco user? No    Allergies  No Known Allergies    Current Medications    Current Outpatient Medications:     Blood Glucose Monitoring Suppl (Accu-Chek Karma Plus) w/Device kit, Test daily before all meals/snacks and once before bedtime., Disp: 1 kit, Rfl: 0    glucose blood (OneTouch Ultra) test strip, CHECK BLOOD SUGAR BEFORE ALL MEALS AND SNACKS AND ONCE BEFORE BEDTIME, Disp: 150 each, Rfl: 3    hydroCHLOROthiazide 25 MG tablet, TAKE 1 TABLET BY MOUTH DAILY, Disp: 90 tablet, Rfl: 1    ibuprofen (ADVIL,MOTRIN) 600 MG tablet, Take 1 tablet by mouth Daily As Needed., Disp: , Rfl:     Januvia 50 MG tablet, TAKE 1 TABLET BY MOUTH DAILY, Disp: 30 tablet, Rfl: 5    Lancets (accu-chek multiclix) lancets, Test daily before all meals/snacks and once before bedtime., Disp: 3 each, Rfl: 0    lisinopril (PRINIVIL,ZESTRIL) 40 MG tablet, Take 1 tablet by mouth Daily., Disp: 30 tablet, Rfl: 3    multivitamin with minerals tablet tablet, Take 1 tablet by mouth Every Other  Day., Disp: , Rfl:     pantoprazole (PROTONIX) 20 MG EC tablet, TAKE 1 TABLET BY MOUTH DAILY, Disp: 30 tablet, Rfl: 4    ProAir  (90 Base) MCG/ACT inhaler, INHALE TWO PUFFS BY MOUTH FOUR TIMES A DAY, Disp: 8.5 g, Rfl: 2    rosuvastatin (CRESTOR) 10 MG tablet, TAKE 1 TABLET BY MOUTH DAILY, Disp: 90 tablet, Rfl: 1    vitamin E 100 UNIT capsule, Take 1 capsule by mouth Daily., Disp: , Rfl:      Review of System  Review of Systems   Constitutional:  Negative for chills and fever.   Respiratory:  Negative for cough and shortness of breath.    Cardiovascular:  Negative for chest pain and palpitations.   Gastrointestinal:  Negative for constipation, diarrhea, nausea and vomiting.   Neurological:  Negative for dizziness and headache.   I have reviewed and confirmed the accuracy of the ROS as documented by the MA/LPN/RN Raymond Saldaña MD    Vitals:    02/13/25 1027   BP: 120/62   Pulse: 95   SpO2: 100%     Body mass index is 23.83 kg/m².    Objective     Physical Exam  Physical Exam  Constitutional:       General: She is not in acute distress.     Appearance: Normal appearance.   HENT:      Head: Normocephalic and atraumatic.   Cardiovascular:      Rate and Rhythm: Normal rate and regular rhythm.   Pulmonary:      Effort: Pulmonary effort is normal. No respiratory distress.      Breath sounds: Normal breath sounds. No wheezing, rhonchi or rales.   Neurological:      General: No focal deficit present.      Mental Status: She is alert and oriented to person, place, and time.   Psychiatric:         Mood and Affect: Mood normal.         Behavior: Behavior normal.         Thought Content: Thought content normal.         Judgment: Judgment normal.       Patient or patient representative verbalized consent for the use of Ambient Listening during the visit with  Raymond Saldaña MD for chart documentation. 2/13/2025  11:32 EST    Raymond Saldaña MD  02/13/2025

## 2025-03-19 RX ORDER — PANTOPRAZOLE SODIUM 20 MG/1
20 TABLET, DELAYED RELEASE ORAL DAILY
Qty: 30 TABLET | Refills: 4 | Status: SHIPPED | OUTPATIENT
Start: 2025-03-19

## 2025-03-20 ENCOUNTER — HOSPITAL ENCOUNTER (OUTPATIENT)
Facility: HOSPITAL | Age: 65
Discharge: HOME | End: 2025-03-20
Admitting: INTERNAL MEDICINE
Payer: MEDICARE

## 2025-03-20 DIAGNOSIS — E11.65 TYPE 2 DIABETES MELLITUS WITH HYPERGLYCEMIA, WITHOUT LONG-TERM CURRENT USE OF INSULIN: ICD-10-CM

## 2025-03-20 DIAGNOSIS — Z09 ENCOUNTER FOR FOLLOW-UP EXAMINATION AFTER COMPLETED TREATMENT FOR CONDITIONS OTHER THAN MALIGNANT NEOPLASM: ICD-10-CM

## 2025-03-20 PROCEDURE — 77080 DXA BONE DENSITY AXIAL: CPT

## 2025-03-24 DIAGNOSIS — I10 ESSENTIAL HYPERTENSION: ICD-10-CM

## 2025-03-24 RX ORDER — LISINOPRIL 40 MG/1
40 TABLET ORAL DAILY
Qty: 30 TABLET | Refills: 3 | Status: SHIPPED | OUTPATIENT
Start: 2025-03-24

## 2025-05-15 ENCOUNTER — OFFICE VISIT (OUTPATIENT)
Dept: FAMILY MEDICINE CLINIC | Facility: CLINIC | Age: 65
End: 2025-05-15
Payer: MEDICARE

## 2025-05-15 VITALS
HEIGHT: 58 IN | DIASTOLIC BLOOD PRESSURE: 70 MMHG | WEIGHT: 114 LBS | OXYGEN SATURATION: 97 % | BODY MASS INDEX: 23.93 KG/M2 | SYSTOLIC BLOOD PRESSURE: 110 MMHG | HEART RATE: 89 BPM

## 2025-05-15 DIAGNOSIS — I10 ESSENTIAL HYPERTENSION: Primary | ICD-10-CM

## 2025-05-15 DIAGNOSIS — E11.65 TYPE 2 DIABETES MELLITUS WITH HYPERGLYCEMIA, WITHOUT LONG-TERM CURRENT USE OF INSULIN: ICD-10-CM

## 2025-05-15 LAB
EXPIRATION DATE: ABNORMAL
HBA1C MFR BLD: 6.6 % (ref 4.5–5.7)
Lab: ABNORMAL

## 2025-05-15 NOTE — PROGRESS NOTES
05/15/2025    My Summary of Visit     This 64-year-old patient presents today for follow-up of diabetes mellitus type 2 and hypertension.  She relates she is feeling well having had no problems in the interim of visits.  Her last hemoglobin A1c done 4 months ago was excellent at 6.5.  Her blood pressure at that time was 120/62.    Her blood pressure today is continues to be in excellent category 110/70 in the left arm sitting position standard cuff.  Her glucose today was 130 she relates.    Her hemoglobin A1c is 6.6.  Note is made that her BMI continues excellently at 23.8.  We complemented her on the excellent diabetes control she is exhibited.    Assessment & Plan  1. Diabetes Mellitus.  - Hemoglobin A1c was 6.5 at the last visit and is now 6.6, indicating very good control.  - Blood glucose level today was 130, slightly elevated due to dietary intake the previous night.  - Advised to continue current regimen, including mindful eating and regular exercise such as walking and dancing.  - If current lifestyle is maintained, it is anticipated that A1c will remain stable or improve.    2. Hypertension.  - Blood pressure readings have been consistently within the normal range over the past year.  - Currently on Lisinopril 40 mg and Hydrochlorothiazide (HCTZ) 12.5 mg.  - No reported cramps or pain associated with medication.  - Advised to continue current medication regimen.    3. Gastroesophageal Reflux Disease.  - Currently taking pantoprazole for stomach issues.  - No new symptoms reported.    4. Weight Management.  - Body Mass Index (BMI) is 23.8, which is within the normal range.  - Advised to continue current physical activities, including walking and dancing, to maintain weight.    Follow-up  The patient will follow up in 5 months.    Results  Labs   - Hemoglobin A1c: 6.6    BMI is within normal parameters. No other follow-up for BMI required.        Plan:  1.)  Continue Januvia 50 mg 1 tab p.o. every  morning  2.)  Continue antihypertensive regimen consisting of HCTZ 25 mg 1 tab p.o. daily and lisinopril 40 mg p.o. daily.  3.)  Follow-up in 5 months for reevaluation of hemoglobin A1c.       CC: Diabetes (F/U---no other issues)  .        HPI  Diabetes  Associated symptoms:     no blurred vision, no polydipsia, no polyuria and no weight loss    Hypoglycemia symptoms:     no confusion, no speech difficulty and no tremors       History of Present Illness  The patient presents for evaluation of diabetes and hypertension.    She reports a blood glucose level of 130 today, which she attributes to her dietary intake the previous night. She has been actively monitoring her diet and is aware of potential food items that could elevate her blood glucose levels. She engages in regular physical activity, including walking her dogs and dancing, and maintains a healthy weight. She expresses a desire to further reduce her hemoglobin A1c level to 5. Her last hemoglobin A1c was 6.6, slightly higher than the previous 6.5, but still within good control. She occasionally experiences stiffness, which she manages by staying active. She also reports intermittent leg cramps, which resolve upon ambulation. She does not experience any difficulty with walking and continues to wear high-heeled shoes without issue.    Her blood pressure has been well-controlled over the past year. She is currently on lisinopril 40 mg and hydrochlorothiazide 12.5 mg for hypertension management. She reports no cramping or pain.    She is on Januvia for diabetes management and pantoprazole for her stomach.    Anam Nieves is a 64 y.o. female.      The following portions of the patient's history were reviewed and updated as appropriate: allergies, current medications, past family history, past medical history, past social history, past surgical history, and problem list.    Problem List  Patient Active Problem List   Diagnosis    Colon cancer  screening    Acute bronchitis with chronic obstructive pulmonary disease (COPD)    Family history of colon cancer in mother    Hyperlipidemia    Hypertension    Hypokalemia    Iliotibial band syndrome of right side    Low back pain radiating to right leg    Tobacco abuse    Trochanteric bursitis of right hip    Polyp of colon    Family history of polyps in the colon    Sleep-disordered breathing       Past Medical History  Past Medical History:   Diagnosis Date    Allergic     Few year ago    Asthma     COPD (chronic obstructive pulmonary disease)     Few years ago    Diabetes mellitus     2 years ago    Family history of colon cancer     GERD (gastroesophageal reflux disease)     Few years ago    Hyperlipidemia     Hypertension     Sleep-disordered breathing 2022    Home sleep study.  Weight 120 pounds.  AHI normal at 3.9 events per hour.  No sleep-related hypoxia.  The patient snored 14% of total monitoring time.       Surgical History  Past Surgical History:   Procedure Laterality Date     SECTION      COLONOSCOPY      COLONOSCOPY N/A 2021    Procedure: COLONOSCOPY to cecum and TI with cold biopsy polypectomies;  Surgeon: Mustapha Valadez MD;  Location: [facility] ENDOSCOPY;  Service: Gastroenterology;  Laterality: N/A;  pre: family h/o colon cancer and personal h/o colon polyps, positive cologuard  post: polyps, hemorrhoids    WISDOM TOOTH EXTRACTION      Years ago       Family History  Family History   Problem Relation Age of Onset    Heart attack Father     Heart disease Father     Hypertension Mother     Colon cancer Mother     Heart disease Sister     Diabetes Sister     Stroke Sister         [place] Hyperthermia Neg Hx        Social History  Social History    Tobacco Use      Smoking status: Every Day        Packs/day: 0.25        Years: 0.3 packs/day for 20.0 years (5.0 ttl pk-yrs)        Types: Cigarettes      Smokeless tobacco: Never      Tobacco comments: During my college  years       Is the Patient a current tobacco user? No    Allergies  No Known Allergies    Current Medications    Current Outpatient Medications:     Blood Glucose Monitoring Suppl (Accu-Chek Karma Plus) w/Device kit, Test daily before all meals/snacks and once before bedtime., Disp: 1 kit, Rfl: 0    glucose blood (OneTouch Ultra) test strip, CHECK BLOOD SUGAR BEFORE ALL MEALS AND SNACKS AND ONCE BEFORE BEDTIME, Disp: 150 each, Rfl: 3    hydroCHLOROthiazide 25 MG tablet, TAKE 1 TABLET BY MOUTH DAILY, Disp: 90 tablet, Rfl: 1    ibuprofen (ADVIL,MOTRIN) 600 MG tablet, Take 1 tablet by mouth Daily As Needed., Disp: , Rfl:     Januvia 50 MG tablet, TAKE 1 TABLET BY MOUTH DAILY, Disp: 30 tablet, Rfl: 5    Lancets (accu-chek multiclix) lancets, Test daily before all meals/snacks and once before bedtime., Disp: 3 each, Rfl: 0    lisinopril (PRINIVIL,ZESTRIL) 40 MG tablet, Take 1 tablet by mouth Daily., Disp: 30 tablet, Rfl: 3    multivitamin with minerals tablet tablet, Take 1 tablet by mouth Every Other Day., Disp: , Rfl:     pantoprazole (PROTONIX) 20 MG EC tablet, Take 1 tablet by mouth Daily., Disp: 30 tablet, Rfl: 4    ProAir  (90 Base) MCG/ACT inhaler, INHALE TWO PUFFS BY MOUTH FOUR TIMES A DAY, Disp: 8.5 g, Rfl: 2    rosuvastatin (CRESTOR) 10 MG tablet, TAKE 1 TABLET BY MOUTH DAILY, Disp: 90 tablet, Rfl: 1    vitamin E 100 UNIT capsule, Take 1 capsule by mouth Daily., Disp: , Rfl:      Review of System  Review of Systems   Constitutional:  Negative for unexpected weight loss.   Eyes:  Negative for blurred vision.   Endocrine: Negative for polydipsia and polyuria.   Neurological:  Negative for tremors, speech difficulty and confusion.     I have reviewed and confirmed the accuracy of the ROS as documented by the MA/LPN/RN Raymond Saldaña MD    Vitals:    05/15/25 1117   BP: 110/70   Pulse: 89   SpO2: 97%     Body mass index is 23.83 kg/m².    Objective     Physical Exam  Physical Exam  Constitutional:        General: She is not in acute distress.     Appearance: Normal appearance.   HENT:      Head: Normocephalic and atraumatic.   Cardiovascular:      Rate and Rhythm: Normal rate and regular rhythm.   Pulmonary:      Effort: Pulmonary effort is normal. No respiratory distress.      Breath sounds: Normal breath sounds. No wheezing, rhonchi or rales.   Neurological:      General: No focal deficit present.      Mental Status: She is alert and oriented to person, place, and time.   Psychiatric:         Mood and Affect: Mood normal.         Behavior: Behavior normal.         Thought Content: Thought content normal.         Judgment: Judgment normal.           Patient or patient representative verbalized consent for the use of Ambient Listening during the visit with  Raymond Saldaña MD for chart documentation. 5/22/2025  12:58 EDT    Raymond Saldaña MD  05/15/2025  Answers submitted by the patient for this visit:  Diabetes Questionnaire (Submitted on 5/8/2025)  Chief Complaint: Diabetes problem  Below 70: never

## 2025-06-13 RX ORDER — ROSUVASTATIN CALCIUM 10 MG/1
TABLET, COATED ORAL
Qty: 90 TABLET | Refills: 1 | Status: SHIPPED | OUTPATIENT
Start: 2025-06-13

## 2025-06-20 DIAGNOSIS — I10 ESSENTIAL HYPERTENSION: ICD-10-CM

## 2025-06-20 RX ORDER — HYDROCHLOROTHIAZIDE 25 MG/1
25 TABLET ORAL DAILY
Qty: 90 TABLET | Refills: 1 | Status: SHIPPED | OUTPATIENT
Start: 2025-06-20

## 2025-07-30 ENCOUNTER — TELEPHONE (OUTPATIENT)
Dept: FAMILY MEDICINE CLINIC | Facility: CLINIC | Age: 65
End: 2025-07-30
Payer: MEDICARE

## 2025-07-30 ENCOUNTER — TELEPHONE (OUTPATIENT)
Dept: FAMILY MEDICINE CLINIC | Facility: CLINIC | Age: 65
End: 2025-07-30

## 2025-07-30 NOTE — TELEPHONE ENCOUNTER
RELAY    We received a fax from [facility] for chart notes within the last 90 days for her continued use for her nebulizer.     The patient will need to schedule an appointment to come in for this.

## 2025-08-05 DIAGNOSIS — I10 ESSENTIAL HYPERTENSION: ICD-10-CM

## 2025-08-05 RX ORDER — LISINOPRIL 40 MG/1
40 TABLET ORAL DAILY
Qty: 30 TABLET | Refills: 3 | Status: CANCELLED | OUTPATIENT
Start: 2025-08-05

## 2025-08-07 ENCOUNTER — EXTERNAL PBMM DATA (OUTPATIENT)
Dept: PHARMACY | Facility: OTHER | Age: 65
End: 2025-08-07
Payer: MEDICARE

## 2025-08-07 DIAGNOSIS — I10 ESSENTIAL HYPERTENSION: ICD-10-CM

## 2025-08-07 RX ORDER — LISINOPRIL 40 MG/1
40 TABLET ORAL DAILY
Qty: 90 TABLET | Refills: 1 | Status: SHIPPED | OUTPATIENT
Start: 2025-08-07

## (undated) DEVICE — LN SMPL CO2 SHTRM SD STREAM W/M LUER

## (undated) DEVICE — KT ORCA ORCAPOD DISP STRL

## (undated) DEVICE — TUBING, SUCTION, 1/4" X 10', STRAIGHT: Brand: MEDLINE

## (undated) DEVICE — ADAPT CLN BIOGUARD AIR/H2O DISP

## (undated) DEVICE — CANN O2 ETCO2 FITS ALL CONN CO2 SMPL A/ 7IN DISP LF

## (undated) DEVICE — SINGLE-USE BIOPSY FORCEPS: Brand: RADIAL JAW 4

## (undated) DEVICE — SENSR O2 OXIMAX FNGR A/ 18IN NONSTR